# Patient Record
Sex: MALE | Race: WHITE | NOT HISPANIC OR LATINO | Employment: FULL TIME | URBAN - NONMETROPOLITAN AREA
[De-identification: names, ages, dates, MRNs, and addresses within clinical notes are randomized per-mention and may not be internally consistent; named-entity substitution may affect disease eponyms.]

---

## 2017-01-16 ENCOUNTER — TELEPHONE (OUTPATIENT)
Dept: FAMILY MEDICINE CLINIC | Facility: CLINIC | Age: 47
End: 2017-01-16

## 2017-01-16 DIAGNOSIS — R73.9 ELEVATED BLOOD SUGAR LEVEL: Primary | ICD-10-CM

## 2017-01-16 DIAGNOSIS — Z13.220 SCREENING CHOLESTEROL LEVEL: ICD-10-CM

## 2017-05-15 RX ORDER — ESCITALOPRAM OXALATE 10 MG/1
TABLET ORAL
Qty: 135 TABLET | Refills: 2 | Status: SHIPPED | OUTPATIENT
Start: 2017-05-15 | End: 2017-07-12 | Stop reason: SDUPTHER

## 2017-07-12 ENCOUNTER — LAB (OUTPATIENT)
Dept: LAB | Facility: HOSPITAL | Age: 47
End: 2017-07-12

## 2017-07-12 ENCOUNTER — OFFICE VISIT (OUTPATIENT)
Dept: FAMILY MEDICINE CLINIC | Facility: CLINIC | Age: 47
End: 2017-07-12

## 2017-07-12 VITALS
WEIGHT: 275.6 LBS | BODY MASS INDEX: 41.77 KG/M2 | HEART RATE: 128 BPM | SYSTOLIC BLOOD PRESSURE: 132 MMHG | DIASTOLIC BLOOD PRESSURE: 80 MMHG | OXYGEN SATURATION: 98 % | HEIGHT: 68 IN

## 2017-07-12 DIAGNOSIS — K21.9 GASTROESOPHAGEAL REFLUX DISEASE WITHOUT ESOPHAGITIS: ICD-10-CM

## 2017-07-12 DIAGNOSIS — F41.0 PANIC DISORDER: Primary | ICD-10-CM

## 2017-07-12 DIAGNOSIS — F41.1 GENERALIZED ANXIETY DISORDER: ICD-10-CM

## 2017-07-12 DIAGNOSIS — Z79.899 HIGH RISK MEDICATION USE: ICD-10-CM

## 2017-07-12 DIAGNOSIS — M25.551 PAIN OF RIGHT HIP JOINT: ICD-10-CM

## 2017-07-12 DIAGNOSIS — Z13.220 SCREENING CHOLESTEROL LEVEL: ICD-10-CM

## 2017-07-12 LAB
ALBUMIN SERPL-MCNC: 4.5 G/DL (ref 3.4–4.8)
ALBUMIN/GLOB SERPL: 1.3 G/DL (ref 1.1–1.8)
ALP SERPL-CCNC: 93 U/L (ref 38–126)
ALT SERPL W P-5'-P-CCNC: 56 U/L (ref 21–72)
ANION GAP SERPL CALCULATED.3IONS-SCNC: 12 MMOL/L (ref 5–15)
ARTICHOKE IGE QN: 164 MG/DL (ref 1–129)
AST SERPL-CCNC: 43 U/L (ref 17–59)
BILIRUB SERPL-MCNC: 0.8 MG/DL (ref 0.2–1.3)
BUN BLD-MCNC: 14 MG/DL (ref 7–21)
BUN/CREAT SERPL: 15.7 (ref 7–25)
CALCIUM SPEC-SCNC: 9.6 MG/DL (ref 8.4–10.2)
CHLORIDE SERPL-SCNC: 103 MMOL/L (ref 95–110)
CHOLEST SERPL-MCNC: 223 MG/DL (ref 0–199)
CO2 SERPL-SCNC: 25 MMOL/L (ref 22–31)
CREAT BLD-MCNC: 0.89 MG/DL (ref 0.7–1.3)
GFR SERPL CREATININE-BSD FRML MDRD: 92 ML/MIN/1.73 (ref 63–147)
GLOBULIN UR ELPH-MCNC: 3.5 GM/DL (ref 2.3–3.5)
GLUCOSE BLD-MCNC: 100 MG/DL (ref 60–100)
HDLC SERPL-MCNC: 42 MG/DL (ref 60–200)
LDLC/HDLC SERPL: 3.33 {RATIO} (ref 0–3.55)
MAGNESIUM SERPL-MCNC: 2.2 MG/DL (ref 1.6–2.3)
POTASSIUM BLD-SCNC: 4 MMOL/L (ref 3.5–5.1)
PROT SERPL-MCNC: 8 G/DL (ref 6.3–8.6)
SODIUM BLD-SCNC: 140 MMOL/L (ref 137–145)
TRIGL SERPL-MCNC: 206 MG/DL (ref 20–199)
VIT B12 BLD-MCNC: 274 PG/ML (ref 239–931)

## 2017-07-12 PROCEDURE — 36415 COLL VENOUS BLD VENIPUNCTURE: CPT | Performed by: FAMILY MEDICINE

## 2017-07-12 PROCEDURE — 99213 OFFICE O/P EST LOW 20 MIN: CPT | Performed by: FAMILY MEDICINE

## 2017-07-12 PROCEDURE — 80053 COMPREHEN METABOLIC PANEL: CPT | Performed by: FAMILY MEDICINE

## 2017-07-12 PROCEDURE — 80061 LIPID PANEL: CPT | Performed by: FAMILY MEDICINE

## 2017-07-12 PROCEDURE — 83735 ASSAY OF MAGNESIUM: CPT | Performed by: FAMILY MEDICINE

## 2017-07-12 PROCEDURE — 82607 VITAMIN B-12: CPT | Performed by: FAMILY MEDICINE

## 2017-07-12 RX ORDER — CELECOXIB 200 MG/1
200 CAPSULE ORAL DAILY
Qty: 90 CAPSULE | Refills: 3 | Status: SHIPPED | OUTPATIENT
Start: 2017-07-12 | End: 2018-02-22 | Stop reason: SDUPTHER

## 2017-07-12 RX ORDER — CLONAZEPAM 0.5 MG/1
0.5 TABLET ORAL 2 TIMES DAILY
Qty: 60 TABLET | Refills: 2 | Status: SHIPPED | OUTPATIENT
Start: 2017-07-12 | End: 2018-02-22 | Stop reason: SDUPTHER

## 2017-07-12 RX ORDER — ESCITALOPRAM OXALATE 10 MG/1
TABLET ORAL
Qty: 135 TABLET | Refills: 2 | Status: SHIPPED | OUTPATIENT
Start: 2017-07-12 | End: 2018-02-22 | Stop reason: SDUPTHER

## 2017-07-12 RX ORDER — OMEPRAZOLE 20 MG/1
20 CAPSULE, DELAYED RELEASE ORAL DAILY
Qty: 90 CAPSULE | Refills: 3 | Status: SHIPPED | OUTPATIENT
Start: 2017-07-12 | End: 2018-02-22

## 2017-07-12 NOTE — PROGRESS NOTES
Subjective   Jamey Berry is a 46 y.o. male.     Anxiety   Presents for follow-up visit. Patient reports no depressed mood, impotence, insomnia or nervous/anxious behavior.     His past medical history is significant for depression.   Depression   Visit Type: follow-up  Patient is not experiencing: depressed mood, fatigue, impotence, insomnia and nervousness/anxiety.    Heartburn   He complains of heartburn. He reports no abdominal pain.        The following portions of the patient's history were reviewed and updated as appropriate: allergies, current medications, past family history, past medical history, past social history, past surgical history and problem list.    Review of Systems   Gastrointestinal: Positive for heartburn. Negative for abdominal distention and abdominal pain.   Genitourinary: Negative for impotence.   Musculoskeletal: Negative for back pain. Arthralgias: right hip.   Psychiatric/Behavioral: The patient is not nervous/anxious and does not have insomnia.        Objective   Physical Exam   Constitutional: He is oriented to person, place, and time. He appears well-developed and well-nourished. No distress.   HENT:   Head: Normocephalic and atraumatic.   Cardiovascular: Normal rate and regular rhythm.  Exam reveals no gallop and no friction rub.    No murmur heard.  Pulmonary/Chest: Effort normal. No respiratory distress. He has no wheezes. He has no rales. He exhibits no tenderness.   Musculoskeletal:        Right hip: He exhibits normal range of motion, normal strength and no tenderness.   Neurological: He is alert and oriented to person, place, and time.   Skin: Skin is warm and dry. He is not diaphoretic.   Psychiatric: He has a normal mood and affect. His behavior is normal. Judgment and thought content normal.   Nursing note and vitals reviewed.      Assessment/Plan   Problems Addressed this Visit        Other    Panic disorder - Primary    Relevant Medications    escitalopram  (LEXAPRO) 10 MG tablet    Generalized anxiety disorder    Relevant Medications    escitalopram (LEXAPRO) 10 MG tablet      Other Visit Diagnoses     Pain of right hip joint        Relevant Orders    Comprehensive Metabolic Panel    Gastroesophageal reflux disease without esophagitis        Relevant Medications    omeprazole (priLOSEC) 20 MG capsule    Other Relevant Orders    Vitamin B12    Magnesium    Screening cholesterol level        Relevant Orders    Lipid Panel    High risk medication use        Relevant Orders    Comprehensive Metabolic Panel    Vitamin B12    Magnesium

## 2017-07-13 NOTE — PROGRESS NOTES
Cholesterol is high.  Recommend low cholesterol diet for 3 months and then recheck fasting lipid panel.  Refer him to a dietitian if he will go.

## 2017-07-17 ENCOUNTER — TELEPHONE (OUTPATIENT)
Dept: FAMILY MEDICINE CLINIC | Facility: CLINIC | Age: 47
End: 2017-07-17

## 2017-07-17 DIAGNOSIS — E78.00 ELEVATED CHOLESTEROL: Primary | ICD-10-CM

## 2017-07-17 NOTE — TELEPHONE ENCOUNTER
Pr Dr. Sood, Mr. Berry has been called with his recent lab results & recommendations.  Continue his current medications and follow-up as planned or sooner if any problems.    Labs ordered for 3 mos.  He does not want to go to the dietician at this time.  He is out of town on business and does not know his future schedulde      ----- Message from Erik Sood MD sent at 7/13/2017  4:38 PM CDT -----  Cholesterol is high.  Recommend low cholesterol diet for 3 months and then recheck fasting lipid panel.  Refer him to a dietitian if he will go.

## 2017-07-17 NOTE — PROGRESS NOTES
Pr Dr. Sood, Mr. Berry has been called with his recent lab results & recommendations.  Continue his current medications and follow-up as planned or sooner if any problems.

## 2017-07-17 NOTE — PROGRESS NOTES
Pr Dr. Sood, Mr. Berry has been called with his recent lab results & recommendations.  Continue his current medications and follow-up as planned or sooner if any problems.    Labs ordered for 3 mos.  He does not want to go to the dietician at this time.  He is out of town on business and does not know his future schedulde

## 2018-02-22 ENCOUNTER — TELEPHONE (OUTPATIENT)
Dept: FAMILY MEDICINE CLINIC | Facility: CLINIC | Age: 48
End: 2018-02-22

## 2018-02-22 ENCOUNTER — PRIOR AUTHORIZATION (OUTPATIENT)
Dept: FAMILY MEDICINE CLINIC | Facility: CLINIC | Age: 48
End: 2018-02-22

## 2018-02-22 ENCOUNTER — APPOINTMENT (OUTPATIENT)
Dept: LAB | Facility: HOSPITAL | Age: 48
End: 2018-02-22

## 2018-02-22 ENCOUNTER — OFFICE VISIT (OUTPATIENT)
Dept: FAMILY MEDICINE CLINIC | Facility: CLINIC | Age: 48
End: 2018-02-22

## 2018-02-22 VITALS
HEART RATE: 105 BPM | SYSTOLIC BLOOD PRESSURE: 132 MMHG | DIASTOLIC BLOOD PRESSURE: 82 MMHG | HEIGHT: 68 IN | WEIGHT: 289 LBS | BODY MASS INDEX: 43.8 KG/M2 | OXYGEN SATURATION: 99 %

## 2018-02-22 DIAGNOSIS — F41.1 GENERALIZED ANXIETY DISORDER: Primary | ICD-10-CM

## 2018-02-22 DIAGNOSIS — E78.00 PURE HYPERCHOLESTEROLEMIA: ICD-10-CM

## 2018-02-22 DIAGNOSIS — F32.A DEPRESSIVE DISORDER: ICD-10-CM

## 2018-02-22 DIAGNOSIS — M25.50 ARTHRALGIA, UNSPECIFIED JOINT: ICD-10-CM

## 2018-02-22 DIAGNOSIS — E78.5 ELEVATED LIPIDS: Primary | ICD-10-CM

## 2018-02-22 DIAGNOSIS — Z79.899 HIGH RISK MEDICATION USE: ICD-10-CM

## 2018-02-22 LAB
ALBUMIN SERPL-MCNC: 4.5 G/DL (ref 3.4–4.8)
ALBUMIN/GLOB SERPL: 1.3 G/DL (ref 1.1–1.8)
ALP SERPL-CCNC: 85 U/L (ref 38–126)
ALT SERPL W P-5'-P-CCNC: 56 U/L (ref 21–72)
ANION GAP SERPL CALCULATED.3IONS-SCNC: 15 MMOL/L (ref 5–15)
ARTICHOKE IGE QN: 179 MG/DL (ref 1–129)
AST SERPL-CCNC: 41 U/L (ref 17–59)
BILIRUB SERPL-MCNC: 0.7 MG/DL (ref 0.2–1.3)
BUN BLD-MCNC: 12 MG/DL (ref 7–21)
BUN/CREAT SERPL: 15.8 (ref 7–25)
CALCIUM SPEC-SCNC: 9.7 MG/DL (ref 8.4–10.2)
CHLORIDE SERPL-SCNC: 102 MMOL/L (ref 95–110)
CHOLEST SERPL-MCNC: 246 MG/DL (ref 0–199)
CO2 SERPL-SCNC: 24 MMOL/L (ref 22–31)
CREAT BLD-MCNC: 0.76 MG/DL (ref 0.7–1.3)
GFR SERPL CREATININE-BSD FRML MDRD: 110 ML/MIN/1.73 (ref 63–147)
GLOBULIN UR ELPH-MCNC: 3.5 GM/DL (ref 2.3–3.5)
GLUCOSE BLD-MCNC: 111 MG/DL (ref 60–100)
HDLC SERPL-MCNC: 43 MG/DL (ref 60–200)
LDLC/HDLC SERPL: 4.08 {RATIO} (ref 0–3.55)
POTASSIUM BLD-SCNC: 4.2 MMOL/L (ref 3.5–5.1)
PROT SERPL-MCNC: 8 G/DL (ref 6.3–8.6)
SODIUM BLD-SCNC: 141 MMOL/L (ref 137–145)
TRIGL SERPL-MCNC: 137 MG/DL (ref 20–199)

## 2018-02-22 PROCEDURE — 99214 OFFICE O/P EST MOD 30 MIN: CPT | Performed by: FAMILY MEDICINE

## 2018-02-22 PROCEDURE — 80061 LIPID PANEL: CPT | Performed by: FAMILY MEDICINE

## 2018-02-22 PROCEDURE — 80053 COMPREHEN METABOLIC PANEL: CPT | Performed by: FAMILY MEDICINE

## 2018-02-22 PROCEDURE — 36415 COLL VENOUS BLD VENIPUNCTURE: CPT | Performed by: FAMILY MEDICINE

## 2018-02-22 RX ORDER — CLONAZEPAM 0.5 MG/1
0.5 TABLET ORAL 2 TIMES DAILY PRN
Qty: 60 TABLET | Refills: 2 | Status: SHIPPED | OUTPATIENT
Start: 2018-02-22 | End: 2018-08-10

## 2018-02-22 RX ORDER — CELECOXIB 200 MG/1
200 CAPSULE ORAL DAILY
Qty: 90 CAPSULE | Refills: 3 | Status: SHIPPED | OUTPATIENT
Start: 2018-02-22 | End: 2018-12-28 | Stop reason: SDUPTHER

## 2018-02-22 RX ORDER — ESCITALOPRAM OXALATE 10 MG/1
TABLET ORAL
Qty: 135 TABLET | Refills: 2 | Status: SHIPPED | OUTPATIENT
Start: 2018-02-22 | End: 2018-08-23 | Stop reason: SDUPTHER

## 2018-02-22 RX ORDER — ATORVASTATIN CALCIUM 40 MG/1
40 TABLET, FILM COATED ORAL DAILY
Qty: 30 TABLET | Refills: 5 | Status: SHIPPED | OUTPATIENT
Start: 2018-02-22 | End: 2018-04-16 | Stop reason: SDUPTHER

## 2018-02-22 NOTE — PATIENT INSTRUCTIONS

## 2018-02-22 NOTE — PROGRESS NOTES
Pr Dr. Sood, Mr. Berry has been called with his recent lab results & recommendations.  Continue his current medications and follow-up as planned or sooner if any problems.    New Script sent and labs ordered.

## 2018-02-22 NOTE — TELEPHONE ENCOUNTER
Pr Dr. Sood, Mr. Berry has been called with his recent lab results & recommendations.  Continue his current medications and follow-up as planned or sooner if any problems.    New Script sent and labs ordered.      ----- Message from Erik Sood MD sent at 2/22/2018  2:15 PM CST -----  Cholesterol has not improved.  It's a little worse.  Recommend Lipitor 40 mg daily and recheck CMP and lipid panel in 3 months.  Call for any side effects or new muscle aches we'll start this medicine.

## 2018-02-22 NOTE — PROGRESS NOTES
Cholesterol has not improved.  It's a little worse.  Recommend Lipitor 40 mg daily and recheck CMP and lipid panel in 3 months.  Call for any side effects or new muscle aches we'll start this medicine.

## 2018-02-22 NOTE — PROGRESS NOTES
Subjective   Jamey Berry is a 47 y.o. male.     Anxiety   Presents for follow-up visit. Patient reports no depressed mood, impotence, insomnia or nervous/anxious behavior.     His past medical history is significant for depression.   Depression   Visit Type: follow-up  Patient is not experiencing: depressed mood, fatigue, impotence, insomnia and nervousness/anxiety.    Hyperlipidemia   This is a new problem. The current episode started more than 1 month ago. The problem is uncontrolled. Recent lipid tests were reviewed and are high. Current antihyperlipidemic treatment includes diet change.   Arthritis   Presents for follow-up visit. He reports no pain, stiffness, joint swelling or joint warmth. Affected locations include the right hip and right knee. His pain is at a severity of 5/10.        The following portions of the patient's history were reviewed and updated as appropriate: allergies, current medications, past family history, past medical history, past social history, past surgical history and problem list.    Review of Systems   Gastrointestinal: Negative for abdominal distention.   Genitourinary: Negative for impotence.   Musculoskeletal: Positive for arthritis. Negative for back pain, joint swelling and stiffness. Arthralgias: right hip.   Psychiatric/Behavioral: The patient is not nervous/anxious and does not have insomnia.        Objective   Physical Exam   Constitutional: He is oriented to person, place, and time. He appears well-developed and well-nourished. No distress.   HENT:   Head: Normocephalic and atraumatic.   Cardiovascular: Normal rate and regular rhythm.  Exam reveals no gallop and no friction rub.    No murmur heard.  Pulmonary/Chest: Effort normal. No respiratory distress. He has no wheezes. He has no rales. He exhibits no tenderness.   Musculoskeletal:        Right hip: He exhibits normal range of motion, normal strength and no tenderness.        Right knee: He exhibits decreased  range of motion. No tenderness found.   Neurological: He is alert and oriented to person, place, and time.   Skin: Skin is warm and dry. He is not diaphoretic.   Psychiatric: He has a normal mood and affect. His behavior is normal. Judgment and thought content normal.   Nursing note and vitals reviewed.      Assessment/Plan   Problems Addressed this Visit        Other    Generalized anxiety disorder - Primary    Relevant Medications    escitalopram (LEXAPRO) 10 MG tablet    Depressive disorder    Relevant Medications    escitalopram (LEXAPRO) 10 MG tablet      Other Visit Diagnoses     Arthralgia, unspecified joint        Relevant Orders    Comprehensive Metabolic Panel    Pure hypercholesterolemia        Relevant Orders    Lipid Panel    High risk medication use        Relevant Orders    Comprehensive Metabolic Panel

## 2018-04-16 RX ORDER — ATORVASTATIN CALCIUM 40 MG/1
40 TABLET, FILM COATED ORAL DAILY
Qty: 30 TABLET | Refills: 5 | Status: SHIPPED | OUTPATIENT
Start: 2018-04-16 | End: 2018-08-23 | Stop reason: SDUPTHER

## 2018-05-02 DIAGNOSIS — G89.29 HIP PAIN, CHRONIC, UNSPECIFIED LATERALITY: Primary | ICD-10-CM

## 2018-05-02 DIAGNOSIS — M25.559 HIP PAIN, CHRONIC, UNSPECIFIED LATERALITY: Primary | ICD-10-CM

## 2018-05-22 DIAGNOSIS — M25.551 RIGHT HIP PAIN: Primary | ICD-10-CM

## 2018-05-23 ENCOUNTER — OFFICE VISIT (OUTPATIENT)
Dept: ORTHOPEDIC SURGERY | Facility: CLINIC | Age: 48
End: 2018-05-23

## 2018-05-23 VITALS — HEIGHT: 68 IN | BODY MASS INDEX: 41.37 KG/M2 | WEIGHT: 273 LBS

## 2018-05-23 DIAGNOSIS — M16.51 POST-TRAUMATIC OSTEOARTHRITIS OF RIGHT HIP: ICD-10-CM

## 2018-05-23 DIAGNOSIS — S82.51XS CLOSED DISPLACED FRACTURE OF MEDIAL MALLEOLUS OF RIGHT TIBIA, SEQUELA: ICD-10-CM

## 2018-05-23 DIAGNOSIS — M25.551 RIGHT HIP PAIN: Primary | ICD-10-CM

## 2018-05-23 DIAGNOSIS — S72.22XD CLOSED LEFT SUBTROCHANTERIC FEMUR FRACTURE, WITH ROUTINE HEALING, SUBSEQUENT ENCOUNTER: ICD-10-CM

## 2018-05-23 PROBLEM — S82.51XA CLOSED DISPLACED FRACTURE OF MEDIAL MALLEOLUS OF RIGHT TIBIA: Status: ACTIVE | Noted: 2018-05-23

## 2018-05-23 PROCEDURE — 99214 OFFICE O/P EST MOD 30 MIN: CPT | Performed by: ORTHOPAEDIC SURGERY

## 2018-05-23 RX ORDER — ACETAMINOPHEN 325 MG/1
1000 TABLET ORAL ONCE
Status: CANCELLED | OUTPATIENT
Start: 2018-08-14 | End: 2018-08-14

## 2018-05-23 RX ORDER — CLINDAMYCIN PHOSPHATE 900 MG/50ML
900 INJECTION INTRAVENOUS ONCE
Status: CANCELLED | OUTPATIENT
Start: 2018-08-14 | End: 2018-08-14

## 2018-05-23 RX ORDER — OXYCODONE HCL 10 MG/1
10 TABLET, FILM COATED, EXTENDED RELEASE ORAL ONCE
Status: CANCELLED | OUTPATIENT
Start: 2018-08-14 | End: 2018-08-14

## 2018-05-23 RX ORDER — PREGABALIN 75 MG/1
75 CAPSULE ORAL ONCE
Status: CANCELLED | OUTPATIENT
Start: 2018-08-14 | End: 2018-08-14
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
## 2018-06-11 DIAGNOSIS — M16.11 PRIMARY OSTEOARTHRITIS OF RIGHT HIP: Primary | ICD-10-CM

## 2018-06-11 DIAGNOSIS — M16.11 PRIMARY OSTEOARTHRITIS OF RIGHT HIP: ICD-10-CM

## 2018-06-11 DIAGNOSIS — M25.551 RIGHT HIP PAIN: Primary | ICD-10-CM

## 2018-06-27 ENCOUNTER — LAB (OUTPATIENT)
Dept: LAB | Facility: HOSPITAL | Age: 48
End: 2018-06-27

## 2018-06-27 DIAGNOSIS — M16.51 POST-TRAUMATIC OSTEOARTHRITIS OF RIGHT HIP: ICD-10-CM

## 2018-06-27 DIAGNOSIS — E78.5 ELEVATED LIPIDS: ICD-10-CM

## 2018-06-27 DIAGNOSIS — M16.11 PRIMARY OSTEOARTHRITIS OF RIGHT HIP: ICD-10-CM

## 2018-06-27 DIAGNOSIS — M25.551 RIGHT HIP PAIN: ICD-10-CM

## 2018-06-27 DIAGNOSIS — E78.00 ELEVATED CHOLESTEROL: ICD-10-CM

## 2018-06-27 LAB
ALBUMIN SERPL-MCNC: 4.6 G/DL (ref 3.4–4.8)
ALBUMIN/GLOB SERPL: 1.4 G/DL (ref 1.1–1.8)
ALP SERPL-CCNC: 84 U/L (ref 38–126)
ALT SERPL W P-5'-P-CCNC: 43 U/L (ref 21–72)
ANION GAP SERPL CALCULATED.3IONS-SCNC: 13 MMOL/L (ref 5–15)
ARTICHOKE IGE QN: 77 MG/DL (ref 1–129)
AST SERPL-CCNC: 37 U/L (ref 17–59)
BILIRUB SERPL-MCNC: 0.9 MG/DL (ref 0.2–1.3)
BILIRUB UR QL STRIP: NEGATIVE
BUN BLD-MCNC: 12 MG/DL (ref 7–21)
BUN/CREAT SERPL: 14.5 (ref 7–25)
CALCIUM SPEC-SCNC: 9.5 MG/DL (ref 8.4–10.2)
CHLORIDE SERPL-SCNC: 101 MMOL/L (ref 95–110)
CHOLEST SERPL-MCNC: 150 MG/DL (ref 0–199)
CLARITY UR: CLEAR
CO2 SERPL-SCNC: 27 MMOL/L (ref 22–31)
COLOR UR: YELLOW
CREAT BLD-MCNC: 0.83 MG/DL (ref 0.7–1.3)
GFR SERPL CREATININE-BSD FRML MDRD: 100 ML/MIN/1.73 (ref 63–147)
GLOBULIN UR ELPH-MCNC: 3.2 GM/DL (ref 2.3–3.5)
GLUCOSE BLD-MCNC: 82 MG/DL (ref 60–100)
GLUCOSE UR STRIP-MCNC: NEGATIVE MG/DL
HDLC SERPL-MCNC: 41 MG/DL (ref 60–200)
HGB UR QL STRIP.AUTO: NEGATIVE
KETONES UR QL STRIP: NEGATIVE
LDLC/HDLC SERPL: 1.84 {RATIO} (ref 0–3.55)
LEUKOCYTE ESTERASE UR QL STRIP.AUTO: NEGATIVE
NITRITE UR QL STRIP: NEGATIVE
PH UR STRIP.AUTO: 7 [PH] (ref 5–9)
POTASSIUM BLD-SCNC: 4 MMOL/L (ref 3.5–5.1)
PROT SERPL-MCNC: 7.8 G/DL (ref 6.3–8.6)
PROT UR QL STRIP: NEGATIVE
SODIUM BLD-SCNC: 141 MMOL/L (ref 137–145)
SP GR UR STRIP: 1.01 (ref 1–1.03)
TRIGL SERPL-MCNC: 167 MG/DL (ref 20–199)
UROBILINOGEN UR QL STRIP: NORMAL

## 2018-06-27 PROCEDURE — 80053 COMPREHEN METABOLIC PANEL: CPT

## 2018-06-27 PROCEDURE — 80061 LIPID PANEL: CPT

## 2018-06-27 PROCEDURE — 81003 URINALYSIS AUTO W/O SCOPE: CPT

## 2018-06-27 PROCEDURE — 36415 COLL VENOUS BLD VENIPUNCTURE: CPT

## 2018-06-28 ENCOUNTER — TELEPHONE (OUTPATIENT)
Dept: FAMILY MEDICINE CLINIC | Facility: CLINIC | Age: 48
End: 2018-06-28

## 2018-06-28 NOTE — TELEPHONE ENCOUNTER
Pr Dr. Sood, Mr. Berry has been called with his recent lab results & recommendations.  Continue his current medications and follow-up as planned or sooner if any problems.      ----- Message from Erik Sood MD sent at 6/28/2018  1:24 PM CDT -----  Ok, call or send card.

## 2018-07-03 ENCOUNTER — HOSPITAL ENCOUNTER (OUTPATIENT)
Dept: CT IMAGING | Facility: HOSPITAL | Age: 48
Discharge: HOME | End: 2018-07-03
Admitting: ORTHOPAEDIC SURGERY

## 2018-07-03 VITALS
DIASTOLIC BLOOD PRESSURE: 84 MMHG | HEART RATE: 92 BPM | SYSTOLIC BLOOD PRESSURE: 124 MMHG | OXYGEN SATURATION: 95 % | RESPIRATION RATE: 18 BRPM

## 2018-07-03 DIAGNOSIS — M16.11 PRIMARY OSTEOARTHRITIS OF RIGHT HIP: ICD-10-CM

## 2018-07-03 DIAGNOSIS — M25.551 RIGHT HIP PAIN: ICD-10-CM

## 2018-07-03 LAB
APPEARANCE FLD: ABNORMAL
COLOR FLD: COLORLESS
CRYSTALS FLD MICRO: NORMAL
RBC # FLD AUTO: 112.5 /MM3 (ref 0–0)
SPECIMEN VOL FLD: 1 ML
WBC # FLD: 2 /MM3 (ref 0–5)

## 2018-07-03 PROCEDURE — 87070 CULTURE OTHR SPECIMN AEROBIC: CPT | Performed by: ORTHOPAEDIC SURGERY

## 2018-07-03 PROCEDURE — 25010000002 TRIAMCINOLONE PER 10 MG: Performed by: RADIOLOGY

## 2018-07-03 PROCEDURE — 89050 BODY FLUID CELL COUNT: CPT | Performed by: ORTHOPAEDIC SURGERY

## 2018-07-03 PROCEDURE — 87205 SMEAR GRAM STAIN: CPT | Performed by: ORTHOPAEDIC SURGERY

## 2018-07-03 PROCEDURE — 25010000002 MEPIVACAINE PF 2 % SOLUTION 20 ML VIAL: Performed by: RADIOLOGY

## 2018-07-03 PROCEDURE — 87015 SPECIMEN INFECT AGNT CONCNTJ: CPT | Performed by: ORTHOPAEDIC SURGERY

## 2018-07-03 PROCEDURE — 89060 EXAM SYNOVIAL FLUID CRYSTALS: CPT | Performed by: ORTHOPAEDIC SURGERY

## 2018-07-03 PROCEDURE — 77012 CT SCAN FOR NEEDLE BIOPSY: CPT

## 2018-07-03 RX ORDER — TRIAMCINOLONE ACETONIDE 40 MG/ML
80 INJECTION, SUSPENSION INTRA-ARTICULAR; INTRAMUSCULAR ONCE
Status: COMPLETED | OUTPATIENT
Start: 2018-07-03 | End: 2018-07-03

## 2018-07-03 RX ADMIN — MEPIVACAINE HYDROCHLORIDE 2 ML: 20 INJECTION, SOLUTION EPIDURAL; INFILTRATION at 13:39

## 2018-07-03 RX ADMIN — TRIAMCINOLONE ACETONIDE 80 MG: 40 INJECTION, SUSPENSION INTRA-ARTICULAR; INTRAMUSCULAR at 13:40

## 2018-07-03 NOTE — PRE-PROCEDURE NOTE
Patient for CT right hip steroid injection.Procedure, risks , and benefits discussed with patient and patient  gave informed consent.  H&P dated 6/27/2018 reviewed with no changes.

## 2018-07-03 NOTE — PRE-PROCEDURE NOTE
Patient for CT directed right hip steroid injection. Procedure, risks , and benefits discussed with patient and patient  gave informed consent.  H&P dated 6/27/2018 reviewed with no changes.

## 2018-07-17 LAB
BACTERIA FLD CULT: NORMAL
GRAM STN SPEC: NORMAL
GRAM STN SPEC: NORMAL

## 2018-08-10 ENCOUNTER — APPOINTMENT (OUTPATIENT)
Dept: PREADMISSION TESTING | Facility: HOSPITAL | Age: 48
End: 2018-08-10

## 2018-08-10 VITALS
HEART RATE: 97 BPM | DIASTOLIC BLOOD PRESSURE: 80 MMHG | SYSTOLIC BLOOD PRESSURE: 120 MMHG | OXYGEN SATURATION: 98 % | HEIGHT: 68 IN | BODY MASS INDEX: 41.37 KG/M2 | WEIGHT: 273 LBS | RESPIRATION RATE: 18 BRPM

## 2018-08-10 LAB
ABO GROUP BLD: NORMAL
BLD GP AB SCN SERPL QL: NEGATIVE
DEPRECATED RDW RBC AUTO: 42.4 FL (ref 35.1–43.9)
ERYTHROCYTE [DISTWIDTH] IN BLOOD BY AUTOMATED COUNT: 13.7 % (ref 11.5–14.5)
HCT VFR BLD AUTO: 48.5 % (ref 39–49)
HGB BLD-MCNC: 16.5 G/DL (ref 13.7–17.3)
Lab: NORMAL
MCH RBC QN AUTO: 28.9 PG (ref 26.5–34)
MCHC RBC AUTO-ENTMCNC: 34 G/DL (ref 31.5–36.3)
MCV RBC AUTO: 85.1 FL (ref 80–98)
MRSA DNA SPEC QL NAA+PROBE: NEGATIVE
PLATELET # BLD AUTO: 290 10*3/MM3 (ref 150–450)
PMV BLD AUTO: 9.8 FL (ref 8–12)
RBC # BLD AUTO: 5.7 10*6/MM3 (ref 4.37–5.74)
RH BLD: POSITIVE
T&S EXPIRATION DATE: NORMAL
WBC NRBC COR # BLD: 8.59 10*3/MM3 (ref 3.2–9.8)

## 2018-08-10 PROCEDURE — 86850 RBC ANTIBODY SCREEN: CPT | Performed by: ANESTHESIOLOGY

## 2018-08-10 PROCEDURE — 36415 COLL VENOUS BLD VENIPUNCTURE: CPT

## 2018-08-10 PROCEDURE — 87641 MR-STAPH DNA AMP PROBE: CPT | Performed by: ORTHOPAEDIC SURGERY

## 2018-08-10 PROCEDURE — 85027 COMPLETE CBC AUTOMATED: CPT | Performed by: ANESTHESIOLOGY

## 2018-08-10 PROCEDURE — 86901 BLOOD TYPING SEROLOGIC RH(D): CPT | Performed by: ANESTHESIOLOGY

## 2018-08-10 PROCEDURE — 86900 BLOOD TYPING SEROLOGIC ABO: CPT | Performed by: ANESTHESIOLOGY

## 2018-08-10 RX ORDER — SODIUM CHLORIDE, SODIUM GLUCONATE, SODIUM ACETATE, POTASSIUM CHLORIDE, AND MAGNESIUM CHLORIDE 526; 502; 368; 37; 30 MG/100ML; MG/100ML; MG/100ML; MG/100ML; MG/100ML
1000 INJECTION, SOLUTION INTRAVENOUS CONTINUOUS
Status: CANCELLED | OUTPATIENT
Start: 2018-08-14

## 2018-08-10 RX ORDER — CLONAZEPAM 0.5 MG/1
0.5 TABLET ORAL 2 TIMES DAILY PRN
COMMUNITY
End: 2018-10-04 | Stop reason: SDUPTHER

## 2018-08-10 ASSESSMENT — HOOS JR
HOOS JR SCORE: 10
HOOS JR SCORE: 58.93

## 2018-08-10 NOTE — DISCHARGE INSTRUCTIONS
[facility]  Pre-op Information and Guidelines    You will be called after 2 p.m. the day before your surgery (Friday for Monday surgery) and notified of your time for arrival and approximate surgery time.  If you have not received a call by 4P.M., please contact Same Day Surgery at [phone] of if outside [facility] call [phone].    Please Follow these Important Safety Guidelines:    • The morning of your procedure, take only the medications listed below with   A sip of water:_____________________________________________       ______________________________________________    • DO NOT eat or drink anything after 12:00 midnight the night before surgery  Specific instructions concerning drinking clear liquids will be discussed during  the pre-surgery instruction call the day before your surgery.    • If you take a blood thinner (ex. Plavix, Coumadin, aspirin), ask your doctor when to stop it before surgery  STOP DATE: _________________    • Only 2 visitors are allowed in patient rooms at a time  Your visitors will be asked to wait in the lobby until the admission process is complete with the exception of a parent with a child and patients in need of special assistance.    • YOU CANNOT DRIVE YOURSELF HOME  You must be accompanied by someone who will be responsible for driving you home after surgery and for your care at home.    • DO NOT chew gum, use breath mints, hard candy, or smoke the day of surgery  • DO NOT drink alcohol for at least 24 hours before your surgery  • DO NOT wear any jewelry and remove all body piercing before coming to the hospital  • DO NOT wear make-up to the hospital  • If you are having surgery on an extremity (arm/leg/foot) remove nail polish/artificial nails on the surgical side  • Clothing, glasses, contacts, dentures, and hairpieces must be removed before surgery  • Bathe the night before or the morning of your surgery and do not use powders/lotions on  skin.

## 2018-08-10 NOTE — PAT
Patient states surgeon did NOT instruct him to get any type of clearance prior to surgery other than our pre op.    Chlorhexidine scrub given with instruction sheet.  Instructions reviewed briefly in pre op, understanding verbalized.

## 2018-08-14 ENCOUNTER — HOSPITAL ENCOUNTER (INPATIENT)
Facility: HOSPITAL | Age: 48
LOS: 1 days | Discharge: HOME | End: 2018-08-15
Attending: ORTHOPAEDIC SURGERY | Admitting: ORTHOPAEDIC SURGERY

## 2018-08-14 ENCOUNTER — APPOINTMENT (OUTPATIENT)
Dept: GENERAL RADIOLOGY | Facility: HOSPITAL | Age: 48
End: 2018-08-14

## 2018-08-14 ENCOUNTER — ANESTHESIA (OUTPATIENT)
Dept: PERIOP | Facility: HOSPITAL | Age: 48
End: 2018-08-14

## 2018-08-14 ENCOUNTER — ANESTHESIA EVENT (OUTPATIENT)
Dept: PERIOP | Facility: HOSPITAL | Age: 48
End: 2018-08-14

## 2018-08-14 DIAGNOSIS — Z74.09 IMPAIRED FUNCTIONAL MOBILITY, BALANCE, GAIT, AND ENDURANCE: Primary | ICD-10-CM

## 2018-08-14 DIAGNOSIS — Z74.09 IMPAIRED MOBILITY AND ACTIVITIES OF DAILY LIVING: ICD-10-CM

## 2018-08-14 DIAGNOSIS — Z78.9 IMPAIRED MOBILITY AND ACTIVITIES OF DAILY LIVING: ICD-10-CM

## 2018-08-14 DIAGNOSIS — M16.51 POST-TRAUMATIC OSTEOARTHRITIS OF RIGHT HIP: ICD-10-CM

## 2018-08-14 LAB
ABO GROUP BLD: NORMAL
ARTERIAL PATENCY WRIST A: ABNORMAL
ATMOSPHERIC PRESS: 749 MMHG
BASE EXCESS BLDA CALC-SCNC: -3.1 MMOL/L (ref 0–2)
BDY SITE: ABNORMAL
BLD GP AB SCN SERPL QL: NEGATIVE
CA-I BLD-MCNC: 3.76 MG/DL (ref 4.6–5.6)
COHGB MFR BLD: 0.9 % (ref 0–5)
GLUCOSE BLDA-MCNC: 120 MMOL/L (ref 65–95)
HCO3 BLDA-SCNC: 21.5 MMOL/L (ref 20–26)
HCT VFR BLD CALC: 38.8 % (ref 38–51)
HGB BLDA-MCNC: 12.7 G/DL (ref 14–18)
Lab: ABNORMAL
Lab: NORMAL
METHGB BLD QL: 0.6 % (ref 0–3)
MODALITY: ABNORMAL
OXYHGB MFR BLDV: 97.7 % (ref 94–99)
PCO2 BLDA: 36.2 MM HG (ref 35–45)
PCO2 TEMP ADJ BLD: ABNORMAL MM HG (ref 35–48)
PH BLDA: 7.38 PH UNITS (ref 7.35–7.45)
PH, TEMP CORRECTED: ABNORMAL PH UNITS
PO2 BLDA: 126 MM HG (ref 83–108)
PO2 TEMP ADJ BLD: ABNORMAL MM HG (ref 83–108)
POTASSIUM BLDA-SCNC: 3.6 MMOL/L (ref 3.4–4.5)
RH BLD: POSITIVE
SAO2 % BLDCOA: 99.2 % (ref 94–99)
SODIUM BLDA-SCNC: 140 MMOL/L (ref 136–146)
T&S EXPIRATION DATE: NORMAL
VENTILATOR MODE: ABNORMAL

## 2018-08-14 PROCEDURE — 86900 BLOOD TYPING SEROLOGIC ABO: CPT | Performed by: ORTHOPAEDIC SURGERY

## 2018-08-14 PROCEDURE — 86901 BLOOD TYPING SEROLOGIC RH(D): CPT | Performed by: ORTHOPAEDIC SURGERY

## 2018-08-14 PROCEDURE — 25010000002 PROPOFOL 10 MG/ML EMULSION: Performed by: NURSE ANESTHETIST, CERTIFIED REGISTERED

## 2018-08-14 PROCEDURE — 25010000002 NEOSTIGMINE 4 MG/4ML SOLUTION PREFILLED SYRINGE: Performed by: NURSE ANESTHETIST, CERTIFIED REGISTERED

## 2018-08-14 PROCEDURE — 88300 SURGICAL PATH GROSS: CPT | Performed by: PATHOLOGY

## 2018-08-14 PROCEDURE — 82805 BLOOD GASES W/O2 SATURATION: CPT

## 2018-08-14 PROCEDURE — 86850 RBC ANTIBODY SCREEN: CPT | Performed by: ORTHOPAEDIC SURGERY

## 2018-08-14 PROCEDURE — 94799 UNLISTED PULMONARY SVC/PX: CPT

## 2018-08-14 PROCEDURE — 88305 TISSUE EXAM BY PATHOLOGIST: CPT | Performed by: ORTHOPAEDIC SURGERY

## 2018-08-14 PROCEDURE — 88311 DECALCIFY TISSUE: CPT | Performed by: PATHOLOGY

## 2018-08-14 PROCEDURE — 73501 X-RAY EXAM HIP UNI 1 VIEW: CPT

## 2018-08-14 PROCEDURE — 88300 SURGICAL PATH GROSS: CPT | Performed by: ORTHOPAEDIC SURGERY

## 2018-08-14 PROCEDURE — 25010000002 SUCCINYLCHOLINE PER 20 MG: Performed by: NURSE ANESTHETIST, CERTIFIED REGISTERED

## 2018-08-14 PROCEDURE — 82375 ASSAY CARBOXYHB QUANT: CPT

## 2018-08-14 PROCEDURE — C1776 JOINT DEVICE (IMPLANTABLE): HCPCS | Performed by: ORTHOPAEDIC SURGERY

## 2018-08-14 PROCEDURE — 83050 HGB METHEMOGLOBIN QUAN: CPT

## 2018-08-14 PROCEDURE — C1713 ANCHOR/SCREW BN/BN,TIS/BN: HCPCS | Performed by: ORTHOPAEDIC SURGERY

## 2018-08-14 PROCEDURE — 25010000002 HYDROMORPHONE PER 4 MG: Performed by: NURSE ANESTHETIST, CERTIFIED REGISTERED

## 2018-08-14 PROCEDURE — 97166 OT EVAL MOD COMPLEX 45 MIN: CPT

## 2018-08-14 PROCEDURE — 87070 CULTURE OTHR SPECIMN AEROBIC: CPT | Performed by: ORTHOPAEDIC SURGERY

## 2018-08-14 PROCEDURE — 87205 SMEAR GRAM STAIN: CPT | Performed by: ORTHOPAEDIC SURGERY

## 2018-08-14 PROCEDURE — 27130 TOTAL HIP ARTHROPLASTY: CPT | Performed by: ORTHOPAEDIC SURGERY

## 2018-08-14 PROCEDURE — 88311 DECALCIFY TISSUE: CPT | Performed by: ORTHOPAEDIC SURGERY

## 2018-08-14 PROCEDURE — 25010000002 ONDANSETRON PER 1 MG: Performed by: NURSE ANESTHETIST, CERTIFIED REGISTERED

## 2018-08-14 PROCEDURE — 25010000002 FENTANYL CITRATE (PF) 100 MCG/2ML SOLUTION: Performed by: NURSE ANESTHETIST, CERTIFIED REGISTERED

## 2018-08-14 PROCEDURE — 25010000002 PHENYLEPHRINE PER 1 ML: Performed by: NURSE ANESTHETIST, CERTIFIED REGISTERED

## 2018-08-14 PROCEDURE — G8978 MOBILITY CURRENT STATUS: HCPCS

## 2018-08-14 PROCEDURE — 0QP604Z REMOVAL OF INTERNAL FIXATION DEVICE FROM RIGHT UPPER FEMUR, OPEN APPROACH: ICD-10-PCS | Performed by: ORTHOPAEDIC SURGERY

## 2018-08-14 PROCEDURE — G8979 MOBILITY GOAL STATUS: HCPCS

## 2018-08-14 PROCEDURE — G8988 SELF CARE GOAL STATUS: HCPCS

## 2018-08-14 PROCEDURE — 0SR90JA REPLACEMENT OF RIGHT HIP JOINT WITH SYNTHETIC SUBSTITUTE, UNCEMENTED, OPEN APPROACH: ICD-10-PCS | Performed by: ORTHOPAEDIC SURGERY

## 2018-08-14 PROCEDURE — G8987 SELF CARE CURRENT STATUS: HCPCS

## 2018-08-14 PROCEDURE — 25010000002 DEXAMETHASONE PER 1 MG: Performed by: NURSE ANESTHETIST, CERTIFIED REGISTERED

## 2018-08-14 PROCEDURE — 76000 FLUOROSCOPY <1 HR PHYS/QHP: CPT

## 2018-08-14 PROCEDURE — 25010000002 MIDAZOLAM PER 1 MG: Performed by: NURSE ANESTHETIST, CERTIFIED REGISTERED

## 2018-08-14 PROCEDURE — 88305 TISSUE EXAM BY PATHOLOGIST: CPT | Performed by: PATHOLOGY

## 2018-08-14 PROCEDURE — C1769 GUIDE WIRE: HCPCS | Performed by: ORTHOPAEDIC SURGERY

## 2018-08-14 PROCEDURE — 97162 PT EVAL MOD COMPLEX 30 MIN: CPT

## 2018-08-14 DEVICE — TOTL HIP GRIPTION CUP DEPUY UPCHRG: Type: IMPLANTABLE DEVICE | Site: HIP | Status: FUNCTIONAL

## 2018-08-14 DEVICE — CABL W CR1.7MM 750ML STRL: Type: IMPLANTABLE DEVICE | Status: FUNCTIONAL

## 2018-08-14 DEVICE — TOTL HIP MOA DEPUY 9641333: Type: IMPLANTABLE DEVICE | Site: HIP | Status: FUNCTIONAL

## 2018-08-14 DEVICE — CORAIL HIP SYSTEM CEMENTLESS FEMORAL STEM HA COATED REVISION 12/14 AMT 135 DEGREES STANDARD COLLAR SIZE 14
Type: IMPLANTABLE DEVICE | Site: HIP | Status: FUNCTIONAL
Brand: CORAIL

## 2018-08-14 DEVICE — PINNACLE GRIPTION ACETABULAR SHELL SECTOR 52MM OD
Type: IMPLANTABLE DEVICE | Site: HIP | Status: FUNCTIONAL
Brand: PINNACLE GRIPTION

## 2018-08-14 DEVICE — PINNACLE HIP SOLUTIONS ALTRX POLYETHYLENE ACETABULAR LINER NEUTRAL 36MM ID 52MM OD
Type: IMPLANTABLE DEVICE | Site: HIP | Status: FUNCTIONAL
Brand: PINNACLE ALTRX

## 2018-08-14 DEVICE — TOTL HIP STEM DEPUY UPCHRG: Type: IMPLANTABLE DEVICE | Site: HIP | Status: FUNCTIONAL

## 2018-08-14 DEVICE — M-SPEC METAL FEMORAL HEAD 12/14 TAPER DIAMETER 36MM -2: Type: IMPLANTABLE DEVICE | Site: HIP | Status: FUNCTIONAL

## 2018-08-14 RX ORDER — SODIUM CHLORIDE, SODIUM GLUCONATE, SODIUM ACETATE, POTASSIUM CHLORIDE, AND MAGNESIUM CHLORIDE 526; 502; 368; 37; 30 MG/100ML; MG/100ML; MG/100ML; MG/100ML; MG/100ML
1000 INJECTION, SOLUTION INTRAVENOUS CONTINUOUS
Status: DISCONTINUED | OUTPATIENT
Start: 2018-08-14 | End: 2018-08-15 | Stop reason: HOSPADM

## 2018-08-14 RX ORDER — SODIUM CHLORIDE 0.9 % (FLUSH) 0.9 %
1-10 SYRINGE (ML) INJECTION AS NEEDED
Status: DISCONTINUED | OUTPATIENT
Start: 2018-08-14 | End: 2018-08-15 | Stop reason: HOSPADM

## 2018-08-14 RX ORDER — OXYCODONE HCL 10 MG/1
10 TABLET, FILM COATED, EXTENDED RELEASE ORAL EVERY 12 HOURS SCHEDULED
Status: DISCONTINUED | OUTPATIENT
Start: 2018-08-14 | End: 2018-08-15 | Stop reason: HOSPADM

## 2018-08-14 RX ORDER — NALOXONE HCL 0.4 MG/ML
0.1 VIAL (ML) INJECTION
Status: DISCONTINUED | OUTPATIENT
Start: 2018-08-14 | End: 2018-08-15 | Stop reason: HOSPADM

## 2018-08-14 RX ORDER — CLINDAMYCIN PHOSPHATE 900 MG/50ML
900 INJECTION INTRAVENOUS EVERY 8 HOURS
Status: COMPLETED | OUTPATIENT
Start: 2018-08-14 | End: 2018-08-15

## 2018-08-14 RX ORDER — ONDANSETRON 2 MG/ML
4 INJECTION INTRAMUSCULAR; INTRAVENOUS ONCE AS NEEDED
Status: DISCONTINUED | OUTPATIENT
Start: 2018-08-14 | End: 2018-08-14 | Stop reason: HOSPADM

## 2018-08-14 RX ORDER — DEXAMETHASONE SODIUM PHOSPHATE 4 MG/ML
INJECTION, SOLUTION INTRA-ARTICULAR; INTRALESIONAL; INTRAMUSCULAR; INTRAVENOUS; SOFT TISSUE AS NEEDED
Status: DISCONTINUED | OUTPATIENT
Start: 2018-08-14 | End: 2018-08-14 | Stop reason: SURG

## 2018-08-14 RX ORDER — MIDAZOLAM HYDROCHLORIDE 1 MG/ML
INJECTION INTRAMUSCULAR; INTRAVENOUS AS NEEDED
Status: DISCONTINUED | OUTPATIENT
Start: 2018-08-14 | End: 2018-08-14 | Stop reason: SURG

## 2018-08-14 RX ORDER — ONDANSETRON 4 MG/1
4 TABLET, ORALLY DISINTEGRATING ORAL EVERY 6 HOURS PRN
Status: DISCONTINUED | OUTPATIENT
Start: 2018-08-14 | End: 2018-08-15 | Stop reason: HOSPADM

## 2018-08-14 RX ORDER — ACETAMINOPHEN 500 MG
1000 TABLET ORAL 4 TIMES DAILY
Status: DISCONTINUED | OUTPATIENT
Start: 2018-08-14 | End: 2018-08-15 | Stop reason: HOSPADM

## 2018-08-14 RX ORDER — ONDANSETRON 4 MG/1
4 TABLET, FILM COATED ORAL EVERY 6 HOURS PRN
Status: DISCONTINUED | OUTPATIENT
Start: 2018-08-14 | End: 2018-08-15 | Stop reason: HOSPADM

## 2018-08-14 RX ORDER — FAMOTIDINE 40 MG/1
40 TABLET, FILM COATED ORAL DAILY
Status: DISCONTINUED | OUTPATIENT
Start: 2018-08-14 | End: 2018-08-15 | Stop reason: HOSPADM

## 2018-08-14 RX ORDER — OXYCODONE HYDROCHLORIDE 5 MG/1
5 TABLET ORAL EVERY 4 HOURS PRN
Status: DISCONTINUED | OUTPATIENT
Start: 2018-08-14 | End: 2018-08-15 | Stop reason: HOSPADM

## 2018-08-14 RX ORDER — ACETAMINOPHEN 325 MG/1
1000 TABLET ORAL ONCE
Status: COMPLETED | OUTPATIENT
Start: 2018-08-14 | End: 2018-08-14

## 2018-08-14 RX ORDER — SODIUM CHLORIDE, SODIUM GLUCONATE, SODIUM ACETATE, POTASSIUM CHLORIDE, AND MAGNESIUM CHLORIDE 526; 502; 368; 37; 30 MG/100ML; MG/100ML; MG/100ML; MG/100ML; MG/100ML
INJECTION, SOLUTION INTRAVENOUS CONTINUOUS PRN
Status: DISCONTINUED | OUTPATIENT
Start: 2018-08-14 | End: 2018-08-14 | Stop reason: SURG

## 2018-08-14 RX ORDER — SODIUM CHLORIDE 9 MG/ML
75 INJECTION, SOLUTION INTRAVENOUS CONTINUOUS
Status: DISCONTINUED | OUTPATIENT
Start: 2018-08-14 | End: 2018-08-15 | Stop reason: HOSPADM

## 2018-08-14 RX ORDER — ROCURONIUM BROMIDE 10 MG/ML
INJECTION, SOLUTION INTRAVENOUS AS NEEDED
Status: DISCONTINUED | OUTPATIENT
Start: 2018-08-14 | End: 2018-08-14 | Stop reason: SURG

## 2018-08-14 RX ORDER — FLUMAZENIL 0.1 MG/ML
0.2 INJECTION INTRAVENOUS AS NEEDED
Status: DISCONTINUED | OUTPATIENT
Start: 2018-08-14 | End: 2018-08-14 | Stop reason: HOSPADM

## 2018-08-14 RX ORDER — CLONAZEPAM 0.5 MG/1
0.5 TABLET ORAL 2 TIMES DAILY PRN
Status: DISCONTINUED | OUTPATIENT
Start: 2018-08-14 | End: 2018-08-15 | Stop reason: HOSPADM

## 2018-08-14 RX ORDER — DIPHENHYDRAMINE HYDROCHLORIDE 50 MG/ML
12.5 INJECTION INTRAMUSCULAR; INTRAVENOUS
Status: DISCONTINUED | OUTPATIENT
Start: 2018-08-14 | End: 2018-08-14 | Stop reason: HOSPADM

## 2018-08-14 RX ORDER — 0.9 % SODIUM CHLORIDE 0.9 %
VIAL (ML) INJECTION AS NEEDED
Status: DISCONTINUED | OUTPATIENT
Start: 2018-08-14 | End: 2018-08-15 | Stop reason: HOSPADM

## 2018-08-14 RX ORDER — PROPOFOL 10 MG/ML
VIAL (ML) INTRAVENOUS AS NEEDED
Status: DISCONTINUED | OUTPATIENT
Start: 2018-08-14 | End: 2018-08-14 | Stop reason: SURG

## 2018-08-14 RX ORDER — HYDROMORPHONE HCL 110MG/55ML
PATIENT CONTROLLED ANALGESIA SYRINGE INTRAVENOUS AS NEEDED
Status: DISCONTINUED | OUTPATIENT
Start: 2018-08-14 | End: 2018-08-14 | Stop reason: SURG

## 2018-08-14 RX ORDER — BACITRACIN 50000 [IU]/1
INJECTION, POWDER, FOR SOLUTION INTRAMUSCULAR AS NEEDED
Status: DISCONTINUED | OUTPATIENT
Start: 2018-08-14 | End: 2018-08-15 | Stop reason: HOSPADM

## 2018-08-14 RX ORDER — NEOSTIGMINE METHYLSULFATE 4 MG/4 ML
SYRINGE (ML) INTRAVENOUS AS NEEDED
Status: DISCONTINUED | OUTPATIENT
Start: 2018-08-14 | End: 2018-08-14 | Stop reason: SURG

## 2018-08-14 RX ORDER — EPHEDRINE SULFATE 50 MG/ML
5 INJECTION, SOLUTION INTRAVENOUS ONCE AS NEEDED
Status: DISCONTINUED | OUTPATIENT
Start: 2018-08-14 | End: 2018-08-14 | Stop reason: HOSPADM

## 2018-08-14 RX ORDER — EPHEDRINE SULFATE 50 MG/ML
INJECTION, SOLUTION INTRAVENOUS AS NEEDED
Status: DISCONTINUED | OUTPATIENT
Start: 2018-08-14 | End: 2018-08-14 | Stop reason: SURG

## 2018-08-14 RX ORDER — ATORVASTATIN CALCIUM 40 MG/1
40 TABLET, FILM COATED ORAL DAILY
Status: DISCONTINUED | OUTPATIENT
Start: 2018-08-14 | End: 2018-08-15 | Stop reason: HOSPADM

## 2018-08-14 RX ORDER — ACETAMINOPHEN 650 MG/1
650 SUPPOSITORY RECTAL ONCE AS NEEDED
Status: DISCONTINUED | OUTPATIENT
Start: 2018-08-14 | End: 2018-08-14 | Stop reason: HOSPADM

## 2018-08-14 RX ORDER — OXYCODONE HCL 10 MG/1
10 TABLET, FILM COATED, EXTENDED RELEASE ORAL ONCE
Status: COMPLETED | OUTPATIENT
Start: 2018-08-14 | End: 2018-08-14

## 2018-08-14 RX ORDER — FENTANYL CITRATE 50 UG/ML
INJECTION, SOLUTION INTRAMUSCULAR; INTRAVENOUS AS NEEDED
Status: DISCONTINUED | OUTPATIENT
Start: 2018-08-14 | End: 2018-08-14 | Stop reason: SURG

## 2018-08-14 RX ORDER — SUCCINYLCHOLINE CHLORIDE 20 MG/ML
INJECTION INTRAMUSCULAR; INTRAVENOUS AS NEEDED
Status: DISCONTINUED | OUTPATIENT
Start: 2018-08-14 | End: 2018-08-14 | Stop reason: SURG

## 2018-08-14 RX ORDER — CALCIUM CHLORIDE 100 MG/ML
INJECTION INTRAVENOUS; INTRAVENTRICULAR AS NEEDED
Status: DISCONTINUED | OUTPATIENT
Start: 2018-08-14 | End: 2018-08-14 | Stop reason: SURG

## 2018-08-14 RX ORDER — ONDANSETRON 2 MG/ML
4 INJECTION INTRAMUSCULAR; INTRAVENOUS EVERY 6 HOURS PRN
Status: DISCONTINUED | OUTPATIENT
Start: 2018-08-14 | End: 2018-08-15 | Stop reason: HOSPADM

## 2018-08-14 RX ORDER — GLYCOPYRROLATE 0.2 MG/ML
INJECTION INTRAMUSCULAR; INTRAVENOUS AS NEEDED
Status: DISCONTINUED | OUTPATIENT
Start: 2018-08-14 | End: 2018-08-14 | Stop reason: SURG

## 2018-08-14 RX ORDER — PREGABALIN 75 MG/1
75 CAPSULE ORAL ONCE
Status: COMPLETED | OUTPATIENT
Start: 2018-08-14 | End: 2018-08-14

## 2018-08-14 RX ORDER — LABETALOL HYDROCHLORIDE 5 MG/ML
5 INJECTION, SOLUTION INTRAVENOUS
Status: DISCONTINUED | OUTPATIENT
Start: 2018-08-14 | End: 2018-08-14 | Stop reason: HOSPADM

## 2018-08-14 RX ORDER — FERROUS SULFATE TAB EC 324 MG (65 MG FE EQUIVALENT) 324 (65 FE) MG
324 TABLET DELAYED RESPONSE ORAL
Status: DISCONTINUED | OUTPATIENT
Start: 2018-08-15 | End: 2018-08-15 | Stop reason: HOSPADM

## 2018-08-14 RX ORDER — CLINDAMYCIN PHOSPHATE 900 MG/50ML
900 INJECTION INTRAVENOUS ONCE
Status: COMPLETED | OUTPATIENT
Start: 2018-08-14 | End: 2018-08-14

## 2018-08-14 RX ORDER — ACETAMINOPHEN 325 MG/1
650 TABLET ORAL ONCE AS NEEDED
Status: DISCONTINUED | OUTPATIENT
Start: 2018-08-14 | End: 2018-08-14 | Stop reason: HOSPADM

## 2018-08-14 RX ORDER — LIDOCAINE HYDROCHLORIDE 20 MG/ML
INJECTION, SOLUTION INFILTRATION; PERINEURAL AS NEEDED
Status: DISCONTINUED | OUTPATIENT
Start: 2018-08-14 | End: 2018-08-14 | Stop reason: SURG

## 2018-08-14 RX ORDER — HYDROMORPHONE HCL 110MG/55ML
0.5 PATIENT CONTROLLED ANALGESIA SYRINGE INTRAVENOUS
Status: DISCONTINUED | OUTPATIENT
Start: 2018-08-14 | End: 2018-08-15 | Stop reason: HOSPADM

## 2018-08-14 RX ORDER — MEPERIDINE HYDROCHLORIDE 50 MG/ML
12.5 INJECTION INTRAMUSCULAR; INTRAVENOUS; SUBCUTANEOUS
Status: DISCONTINUED | OUTPATIENT
Start: 2018-08-14 | End: 2018-08-14 | Stop reason: HOSPADM

## 2018-08-14 RX ORDER — SENNA AND DOCUSATE SODIUM 50; 8.6 MG/1; MG/1
2 TABLET, FILM COATED ORAL NIGHTLY
Status: DISCONTINUED | OUTPATIENT
Start: 2018-08-14 | End: 2018-08-15 | Stop reason: HOSPADM

## 2018-08-14 RX ORDER — ONDANSETRON 2 MG/ML
INJECTION INTRAMUSCULAR; INTRAVENOUS AS NEEDED
Status: DISCONTINUED | OUTPATIENT
Start: 2018-08-14 | End: 2018-08-14 | Stop reason: SURG

## 2018-08-14 RX ORDER — NALOXONE HCL 0.4 MG/ML
0.2 VIAL (ML) INJECTION AS NEEDED
Status: DISCONTINUED | OUTPATIENT
Start: 2018-08-14 | End: 2018-08-14 | Stop reason: HOSPADM

## 2018-08-14 RX ADMIN — OXYCODONE HYDROCHLORIDE 10 MG: 10 TABLET, FILM COATED, EXTENDED RELEASE ORAL at 17:27

## 2018-08-14 RX ADMIN — EPHEDRINE SULFATE 10 MG: 50 INJECTION INTRAVENOUS at 08:49

## 2018-08-14 RX ADMIN — TRANEXAMIC ACID 1000 MG: 100 INJECTION, SOLUTION INTRAVENOUS at 10:31

## 2018-08-14 RX ADMIN — ACETAMINOPHEN 975 MG: 325 TABLET ORAL at 06:02

## 2018-08-14 RX ADMIN — SODIUM CHLORIDE, SODIUM GLUCONATE, SODIUM ACETATE, POTASSIUM CHLORIDE, AND MAGNESIUM CHLORIDE 1000 ML: 526; 502; 368; 37; 30 INJECTION, SOLUTION INTRAVENOUS at 06:04

## 2018-08-14 RX ADMIN — PHENYLEPHRINE HYDROCHLORIDE 100 MCG: 10 INJECTION INTRAVENOUS at 08:30

## 2018-08-14 RX ADMIN — SODIUM CHLORIDE, SODIUM GLUCONATE, SODIUM ACETATE, POTASSIUM CHLORIDE, AND MAGNESIUM CHLORIDE: 526; 502; 368; 37; 30 INJECTION, SOLUTION INTRAVENOUS at 07:54

## 2018-08-14 RX ADMIN — SODIUM CHLORIDE 75 ML/HR: 9 INJECTION, SOLUTION INTRAVENOUS at 14:37

## 2018-08-14 RX ADMIN — SUCCINYLCHOLINE CHLORIDE 180 MG: 20 INJECTION, SOLUTION INTRAMUSCULAR; INTRAVENOUS at 07:19

## 2018-08-14 RX ADMIN — PREGABALIN 75 MG: 75 CAPSULE ORAL at 06:01

## 2018-08-14 RX ADMIN — PHENYLEPHRINE HYDROCHLORIDE 200 MCG: 10 INJECTION INTRAVENOUS at 07:42

## 2018-08-14 RX ADMIN — PHENYLEPHRINE HYDROCHLORIDE 200 MCG: 10 INJECTION INTRAVENOUS at 08:25

## 2018-08-14 RX ADMIN — ONDANSETRON 4 MG: 2 INJECTION INTRAMUSCULAR; INTRAVENOUS at 10:37

## 2018-08-14 RX ADMIN — SODIUM CHLORIDE, SODIUM GLUCONATE, SODIUM ACETATE, POTASSIUM CHLORIDE, AND MAGNESIUM CHLORIDE: 526; 502; 368; 37; 30 INJECTION, SOLUTION INTRAVENOUS at 10:52

## 2018-08-14 RX ADMIN — Medication 3 MG: at 10:35

## 2018-08-14 RX ADMIN — DEXAMETHASONE SODIUM PHOSPHATE 4 MG: 4 INJECTION, SOLUTION INTRAMUSCULAR; INTRAVENOUS at 08:00

## 2018-08-14 RX ADMIN — TRANEXAMIC ACID 1000 MG: 100 INJECTION, SOLUTION INTRAVENOUS at 06:58

## 2018-08-14 RX ADMIN — ACETAMINOPHEN 1000 MG: 500 TABLET ORAL at 20:07

## 2018-08-14 RX ADMIN — ROCURONIUM BROMIDE 20 MG: 10 INJECTION INTRAVENOUS at 10:03

## 2018-08-14 RX ADMIN — FENTANYL CITRATE 100 MCG: 50 INJECTION, SOLUTION INTRAMUSCULAR; INTRAVENOUS at 07:19

## 2018-08-14 RX ADMIN — EPHEDRINE SULFATE 5 MG: 50 INJECTION INTRAVENOUS at 08:18

## 2018-08-14 RX ADMIN — CLINDAMYCIN IN 5 PERCENT DEXTROSE 900 MG: 18 INJECTION, SOLUTION INTRAVENOUS at 07:40

## 2018-08-14 RX ADMIN — OXYCODONE HYDROCHLORIDE 5 MG: 5 TABLET ORAL at 14:39

## 2018-08-14 RX ADMIN — ACETAMINOPHEN 1000 MG: 500 TABLET ORAL at 17:27

## 2018-08-14 RX ADMIN — ROCURONIUM BROMIDE 45 MG: 10 INJECTION INTRAVENOUS at 07:30

## 2018-08-14 RX ADMIN — CLINDAMYCIN IN 5 PERCENT DEXTROSE 900 MG: 18 INJECTION, SOLUTION INTRAVENOUS at 15:18

## 2018-08-14 RX ADMIN — ESCITALOPRAM 15 MG: 5 TABLET, FILM COATED ORAL at 20:07

## 2018-08-14 RX ADMIN — ROCURONIUM BROMIDE 30 MG: 10 INJECTION INTRAVENOUS at 09:05

## 2018-08-14 RX ADMIN — MIDAZOLAM 2 MG: 1 INJECTION INTRAMUSCULAR; INTRAVENOUS at 07:11

## 2018-08-14 RX ADMIN — CALCIUM CHLORIDE 1 G: 100 INJECTION, SOLUTION INTRAVENOUS at 10:00

## 2018-08-14 RX ADMIN — HYDROMORPHONE HYDROCHLORIDE 0.5 MG: 2 INJECTION INTRAMUSCULAR; INTRAVENOUS; SUBCUTANEOUS at 10:37

## 2018-08-14 RX ADMIN — OXYCODONE HYDROCHLORIDE 10 MG: 10 TABLET, FILM COATED, EXTENDED RELEASE ORAL at 06:02

## 2018-08-14 RX ADMIN — FENTANYL CITRATE 150 MCG: 50 INJECTION, SOLUTION INTRAMUSCULAR; INTRAVENOUS at 07:25

## 2018-08-14 RX ADMIN — ROCURONIUM BROMIDE 5 MG: 10 INJECTION INTRAVENOUS at 07:19

## 2018-08-14 RX ADMIN — PROPOFOL 150 MG: 10 INJECTION, EMULSION INTRAVENOUS at 07:19

## 2018-08-14 RX ADMIN — SODIUM CHLORIDE, SODIUM GLUCONATE, SODIUM ACETATE, POTASSIUM CHLORIDE, AND MAGNESIUM CHLORIDE: 526; 502; 368; 37; 30 INJECTION, SOLUTION INTRAVENOUS at 07:30

## 2018-08-14 RX ADMIN — PHENYLEPHRINE HYDROCHLORIDE 100 MCG: 10 INJECTION INTRAVENOUS at 07:32

## 2018-08-14 RX ADMIN — GLYCOPYRROLATE 0.4 MG: 0.2 INJECTION, SOLUTION INTRAMUSCULAR; INTRAVENOUS at 10:35

## 2018-08-14 RX ADMIN — SENNOSIDES AND DOCUSATE SODIUM 2 TABLET: 8.6; 5 TABLET ORAL at 20:06

## 2018-08-14 RX ADMIN — LIDOCAINE HYDROCHLORIDE 100 MG: 20 INJECTION, SOLUTION INFILTRATION; PERINEURAL at 07:19

## 2018-08-14 RX ADMIN — ROCURONIUM BROMIDE 30 MG: 10 INJECTION INTRAVENOUS at 08:20

## 2018-08-14 RX ADMIN — HYDROMORPHONE HYDROCHLORIDE 0.5 MG: 2 INJECTION INTRAMUSCULAR; INTRAVENOUS; SUBCUTANEOUS at 11:07

## 2018-08-14 RX ADMIN — FAMOTIDINE 40 MG: 40 TABLET ORAL at 14:39

## 2018-08-14 RX ADMIN — PHENYLEPHRINE HYDROCHLORIDE 200 MCG: 10 INJECTION INTRAVENOUS at 07:53

## 2018-08-14 RX ADMIN — SODIUM CHLORIDE, SODIUM GLUCONATE, SODIUM ACETATE, POTASSIUM CHLORIDE, AND MAGNESIUM CHLORIDE: 526; 502; 368; 37; 30 INJECTION, SOLUTION INTRAVENOUS at 08:49

## 2018-08-14 RX ADMIN — OXYCODONE HYDROCHLORIDE 5 MG: 5 TABLET ORAL at 20:16

## 2018-08-14 RX ADMIN — FENTANYL CITRATE 100 MCG: 50 INJECTION, SOLUTION INTRAMUSCULAR; INTRAVENOUS at 09:14

## 2018-08-14 RX ADMIN — ATORVASTATIN CALCIUM 40 MG: 40 TABLET, FILM COATED ORAL at 14:38

## 2018-08-14 NOTE — PLAN OF CARE
Problem: Patient Care Overview  Goal: Plan of Care Review  Outcome: Ongoing (interventions implemented as appropriate)   08/14/18 7813   Coping/Psychosocial   Plan of Care Reviewed With patient;spouse   OTHER   Outcome Summary OT evaluation completed today. Pt is a 46 y/o M s/p right THR posterior approach. Pt completed bed mobility with min A, sit <> stand t/f with min A with FWW, min distance functional ambulation with CGA with FWW. Pt required min VC to adhere to hip precautions. Pt could benefit from skilled OT services to address decreased strength, activity tolerance, transfers, functional mobility, AD/AE utilization and independence with ADLs. Recommend pt d/c home with assist and OP PT services.

## 2018-08-14 NOTE — THERAPY EVALUATION
Acute Care - Physical Therapy Initial Evaluation  [facility]     Patient Name: Jamey Berry  : 1970  MRN: [id number]  Today's Date: 2018   Onset of Illness/Injury or Date of Surgery: 18  Date of Referral to PT: 18  Referring Physician: Luiz Durham MD      Admit Date: 2018    Visit Dx:     ICD-10-CM ICD-9-CM   1. Impaired functional mobility, balance, gait, and endurance Z74.09 V49.89   2. Post-traumatic osteoarthritis of right hip M16.51 715.25   3. Impaired mobility and activities of daily living Z74.09 799.89     Patient Active Problem List   Diagnosis   • Panic disorder   • Morbid obesity (CMS/HCC)   • Generalized anxiety disorder   • Elevated blood pressure reading without diagnosis of hypertension   • Depressive disorder   • Pain in lower limb   • Right hip pain   • Closed displaced fracture of medial malleolus of right tibia   • Post-traumatic osteoarthritis of right hip   • Closed left subtrochanteric femur fracture, with routine healing, subsequent encounter     Past Medical History:   Diagnosis Date   • Arthritis    • Chest pain    • Depressive disorder    • Dyspnea    • Elevated blood pressure reading without diagnosis of hypertension    • Generalized anxiety disorder    • Hip pain     ortho recommended replacement   • Hyperlipidemia    • Keloid scar     r/o cancer   • Morbid obesity (CMS/HCC)    • Need for immunization against influenza    • Pain in lower limb     right post fx/phong   • Panic disorder    • Skin cancer     basal cell removed from scalp   • Sleep apnea      Past Surgical History:   Procedure Laterality Date   • ORIF FEMUR FRACTURE      hip and ankle done at same time secondary to trauma        PT ASSESSMENT (last 12 hours)      Physical Therapy Evaluation     Row Name 18 1336          PT Evaluation Time/Intention    Subjective Information complains of;pain  -KW     Document Type evaluation  -KW     Mode of Treatment individual  therapy;physical therapy  -KW     Patient Effort excellent  -KW     Symptoms Noted During/After Treatment none  -KW     Row Name 08/14/18 1336          General Information    Patient Profile Reviewed? yes  -KW     Onset of Illness/Injury or Date of Surgery 08/14/18  -KW     Referring Physician Luiz Durham MD  -KW     Patient Observations alert;cooperative;agree to therapy  -KW     Patient/Family Observations wife present during eval  -KW     General Observations of Patient Pt on IV, Bilateral SCDs, 5L supplemental O2 via NC, and had drain on R side  -KW     Equipment Currently Used at Home none   currently has rollator, bath bench, commode, hospital bed  -KW     Existing Precautions/Restrictions hip, posterior;fall  -KW     Equipment Issued to Patient gait belt  -KW     Risks Reviewed patient:;spouse/S.O.:;nausea/vomiting;dizziness;increased discomfort;change in vital signs  -KW     Benefits Reviewed patient:;spouse/S.O.:;improve function;increase independence;increase strength;increase balance;decrease pain  -KW     Row Name 08/14/18 1336          Relationship/Environment    Lives With significant other;child(sumanth), dependent  -KW     Row Name 08/14/18 1336          Resource/Environmental Concerns    Current Living Arrangements home/apartment/condo  -KW     Resource/Environmental Concerns none  -KW     Row Name 08/14/18 1336          Cognitive Assessment/Interventions    Additional Documentation Cognitive Assessment/Intervention (Group)  -KW     Row Name 08/14/18 1336          Cognitive Assessment/Intervention- PT/OT    Affect/Mental Status (Cognitive) WNL  -KW     Orientation Status (Cognition) oriented x 4  -KW     Follows Commands (Cognition) WNL  -KW     Row Name 08/14/18 1336          Mobility Assessment/Treatment    Extremity Weight-bearing Status right lower extremity  -KW     Right Lower Extremity (Weight-bearing Status) weight-bearing as tolerated (WBAT)  -KW     Row Name 08/14/18 1336          Bed  Mobility Assessment/Treatment    Bed Mobility Assessment/Treatment rolling right;supine-sit  -KW     Rolling Right [place] (Bed Mobility) minimum assist (75% patient effort)  -KW     Supine-Sit [place] (Bed Mobility) minimum assist (75% patient effort)  -KW     Assistive Device (Bed Mobility) bed rails  -KW     Row Name 08/14/18 1336          Transfer Assessment/Treatment    Transfer Assessment/Treatment sit-stand transfer;stand-sit transfer  -KW     Maintains Weight-bearing Status (Transfers) able to maintain  -KW     Sit-Stand [place] (Transfers) minimum assist (75% patient effort);verbal cues  -KW     Stand-Sit [place] (Transfers) minimum assist (75% patient effort);verbal cues  -KW     Row Name 08/14/18 1336          Sit-Stand Transfer    Assistive Device (Sit-Stand Transfers) walker, front-wheeled  -KW     Row Name 08/14/18 1336          Stand-Sit Transfer    Assistive Device (Stand-Sit Transfers) walker, front-wheeled  -KW     Row Name 08/14/18 1336          Gait/Stairs Assessment/Training    Gait/Stairs Assessment/Training gait/ambulation assistive device;maintains weight-bearing status  -KW     [place] Level (Gait) minimum assist (75% patient effort);verbal cues  -KW     Assistive Device (Gait) walker, front-wheeled  -KW     Distance in Feet (Gait) 12  -KW     Pattern (Gait) 3-point  -KW     Row Name 08/14/18 1336          General ROM    GENERAL ROM COMMENTS B UE and LLE WFL; RLE restricted d/t recent surgery  -KW     Row Name 08/14/18 1336          MMT (Manual Muscle Testing)    Additional Documentation General Assessment (Manual Muscle Testing) (Group)  -KW     Row Name 08/14/18 1336          General Assessment (Manual Muscle Testing)    General Manual Muscle Testing (MMT) Assessment lower extremity strength deficits identified  -KW     Comment, General Manual Muscle Testing (MMT) Assessment please see OT eval for UE evaluation  -KW     Row Name 08/14/18 1336          Lower  Extremity (Manual Muscle Testing)    Comment, MMT: Lower Extremity hip flexion L 4+/5; knee flexion and extension L 4+/5; ankle DF/PF 5/5; RLE MMT not formally assessed d/t recent MIREILLE  -KW     Row Name 08/14/18 1336          Vision Assessment/Intervention    Visual Impairment/Limitations corrective lenses for reading  -KW     Row Name 08/14/18 1336          Pain Assessment    Additional Documentation Pain Scale: Numbers Pre/Post-Treatment (Group)  -KW     Row Name 08/14/18 1336          Pain Scale: Numbers Pre/Post-Treatment    Pain Scale: Numbers, Pretreatment 4/10  -KW     Pain Scale: Numbers, Post-Treatment 4/10  -KW     Pain Location - Side Right  -KW     Pain Location - Orientation generalized  -KW     Pain Location hip  -KW     Pre/Post Treatment Pain Comment pt reports decreased pain after evaluation   -KW     Row Name             Wound 08/14/18 1028 Right hip surgical    Wound - Properties Group Date first assessed: 08/14/18  -KWA Time first assessed: 1028  -KWA Present On Admission : no  -KWA Side: Right  -KWA Location: hip  -KWA Type: surgical  -KWA    Row Name 08/14/18 1336          Plan of Care Review    Plan of Care Reviewed With patient;spouse  -KW     Row Name 08/14/18 1336          Physical Therapy Clinical Impression    Date of Referral to PT 08/14/18  -KW     PT Diagnosis (PT Clinical Impression) Impaired functional mobility, gait, endurance, and balance   -KW     Criteria for Skilled Interventions Met (PT Clinical Impression) yes;treatment indicated  -KW     Pathology/Pathophysiology Noted (Describe Specifically for Each System) musculoskeletal  -KW     Impairments Found (describe specific impairments) aerobic capacity/endurance;muscle performance;gait, locomotion, and balance;ROM  -KW     Functional Limitations in Following Categories (Describe Specific Limitations) self-care;home management;work;community/leisure  -KW     Disability: Inability to Perform Actions/Activities of Required Roles  (describe specific disability) work;community/leisure  -KW     Rehab Potential (PT Clinical Summary) good, to achieve stated therapy goals  -KW     Predicted Duration of Therapy (PT) until goals met or discharged from hospital   -KW     Care Plan Review (PT) evaluation/treatment results reviewed;care plan/treatment goals reviewed;risks/benefits reviewed  -KW     Care Plan Review, Other Participant (PT Clinical Impression) significant other  -KW     Row Name 08/14/18 1336          Vital Signs    Pre Systolic BP Rehab 149  -KW     Pre Treatment Diastolic BP 89  -KW     Post Systolic BP Rehab 121  -KW     Post Treatment Diastolic BP 97  -KW     Pretreatment Heart Rate (beats/min) 121  -KW     Intratreatment Heart Rate (beats/min) 150  -KW     Posttreatment Heart Rate (beats/min) 145  -KW     Pre SpO2 (%) 88  -KW     O2 Delivery Pre Treatment supplemental O2   5L O2 via NC  -KW     Intra SpO2 (%) 94  -KW     O2 Delivery Intra Treatment supplemental O2  -KW     Post SpO2 (%) 95  -KW     O2 Delivery Post Treatment supplemental O2  -KW     Pre Patient Position Supine  -KW     Intra Patient Position Standing  -KW     Post Patient Position Sitting  -KW     Row Name 08/14/18 1336          Physical Therapy Goals    Bed Mobility Goal Selection (PT) bed mobility, PT goal 1  -KW     Transfer Goal Selection (PT) transfer, PT goal 1  -KW     Gait Training Goal Selection (PT) gait training, PT goal 1  -KW     Stairs Goal Selection (PT) stairs, PT goal 1  -KW     Additional Documentation Stairs Goal Selection (PT) (Row)  -KW     Row Name 08/14/18 1336          Bed Mobility Goal 1 (PT)    Activity/Assistive Device (Bed Mobility Goal 1, PT) rolling to left;rolling to right;sit to supine/supine to sit  -KW     Power Level/Cues Needed (Bed Mobility Goal 1, PT) independent  -KW     Time Frame (Bed Mobility Goal 1, PT) 3 days;short term goal (STG)  -KW     Progress/Outcomes (Bed Mobility Goal 1, PT) continuing progress toward  goal;goal not met  -KW     Row Name 08/14/18 1336          Transfer Goal 1 (PT)    Activity/Assistive Device (Transfer Goal 1, PT) sit-to-stand/stand-to-sit;bed-to-chair/chair-to-bed;walker, rolling  -KW     [place] Level/Cues Needed (Transfer Goal 1, PT) conditional independence  -KW     Time Frame (Transfer Goal 1, PT) short term goal (STG);3 days  -KW     Progress/Outcome (Transfer Goal 1, PT) continuing progress toward goal;goal not met  -KW     Row Name 08/14/18 1336          Gait Training Goal 1 (PT)    Activity/Assistive Device (Gait Training Goal 1, PT) gait (walking locomotion);assistive device use;decrease fall risk;walker, rolling  -KW     [place] Level (Gait Training Goal 1, PT) conditional independence  -KW     Distance (Gait Goal 1, PT) 150 ft or more  -KW     Time Frame (Gait Training Goal 1, PT) 5 days  -KW     Progress/Outcome (Gait Training Goal 1, PT) continuing progress toward goal;goal not met  -KW     Row Name 08/14/18 1336          Stairs Goal 1 (PT)    Activity/Assistive Device (Stairs Goal 1, PT) ascending stairs;descending stairs;using handrail, right;using handrail, left  -KW     [place] Level/Cues Needed (Stairs Goal 1, PT) conditional independence  -KW     Number of Stairs (Stairs Goal 1, PT) 12  -KW     Time Frame (Stairs Goal 1, PT) 10 days  -KW     Progress/Outcome (Stairs Goal 1, PT) continuing progress toward goal;goal not met  -KW     Row Name 08/14/18 1336          Patient Education Goal (PT)    Activity (Patient Education Goal, PT) HEP   -KW     [place]/Cues/Accuracy (Memory Goal 2, PT) demonstrates adequately  -KW     Time Frame (Patient Education Goal, PT) by discharge  -KW     Progress/Outcome (Patient Education Goal, PT) continuing progress toward goal;goal not met  -KW     Row Name 08/14/18 1336          Positioning and Restraints    Pre-Treatment Position in bed  -KW     Post Treatment Position chair  -KW     In Chair notified nsg;reclined;call light  within reach;encouraged to call for assist;with family/caregiver  -KW       User Key  (r) = Recorded By, (t) = Taken By, (c) = Cosigned By    Initials Name Provider Type    Denise Green RN Registered Nurse    Pricilla Shelton, PT Physical Therapist          Physical Therapy Education     Title: PT OT SLP Therapies (Active)     Topic: Physical Therapy (Active)     Point: Mobility training (Done)    Learning Progress Summary     Learner Status Readiness Method Response Comment Documented by    Patient Done Acceptance E,D VU,NR Hip precautions reviewed to avoid hip flexion past 90 deg, hip IR, and hip adduction  08/14/18 1511    Significant Other Done Acceptance E,D VU,NR Hip precautions reviewed to avoid hip flexion past 90 deg, hip IR, and hip adduction  08/14/18 1511          Point: Precautions (Done)    Learning Progress Summary     Learner Status Readiness Method Response Comment Documented by    Patient Done Acceptance E,D VU,NR Hip precautions reviewed to avoid hip flexion past 90 deg, hip IR, and hip adduction  08/14/18 1511    Significant Other Done Acceptance E,D VU,NR Hip precautions reviewed to avoid hip flexion past 90 deg, hip IR, and hip adduction  08/14/18 1511                      User Key     Initials Effective Dates Name Provider Type Discipline     07/23/18 -  Pricilla Payne, FERNIE Physical Therapist PT                PT Recommendation and Plan  Anticipated Discharge Disposition (PT): home with assist, home with OP services  Planned Therapy Interventions (PT Eval): balance training, bed mobility training, gait training, home exercise program, strengthening, ROM (range of motion), transfer training  Therapy Frequency (PT Clinical Impression): other (see comments) (5-14x/week)  Outcome Summary/Treatment Plan (PT)  Anticipated Discharge Disposition (PT): home with assist, home with OP services  Plan of Care Reviewed With: patient, spouse  Outcome Summary: PT evaluation received and  completed. Pt agreeable to therapy. Hip precautions reviewed with patient and patient's wife. Patient able to peform sit to stand Kimmy and ambulate 12 ft Kimmy with front wheeled walker. Pt would benefit from further skilled PT to address endurance, gait, strength, and ROM deficits.            Outcome Measures     Row Name 08/14/18 1337 08/14/18 1336          How much help from another person do you currently need...    Turning from your back to your side while in flat bed without using bedrails?  -- 3  -KW     Moving from lying on back to sitting on the side of a flat bed without bedrails?  -- 3  -KW     Moving to and from a bed to a chair (including a wheelchair)?  -- 3  -KW     Standing up from a chair using your arms (e.g., wheelchair, bedside chair)?  -- 3  -KW     Climbing 3-5 steps with a railing?  -- 2  -KW     To walk in hospital room?  -- 3  -KW     AM-[facility] 6 Clicks Score  -- 17  -KW        How much help from another is currently needed...    Putting on and taking off regular lower body clothing? 2  -MR  --     Bathing (including washing, rinsing, and drying) 2  -MR  --     Toileting (which includes using toilet bed pan or urinal) 2  -MR  --     Putting on and taking off regular upper body clothing 4  -MR  --     Taking care of personal grooming (such as brushing teeth) 4  -MR  --     Eating meals 4  -MR  --     Score 18  -MR  --        Functional Assessment    Outcome Measure Options AM-PAC 6 Clicks Daily Activity (OT)  - AM-[facility] 6 Clicks Basic Mobility (PT)  -KW       User Key  (r) = Recorded By, (t) = Taken By, (c) = Cosigned By    Initials Name Provider Type    MR Jackman Beth LAURA, OT Occupational Therapist    KW Pricilla Payne, PT Physical Therapist           Time Calculation:         PT Charges     Row Name 08/14/18 0288             Time Calculation    Start Time 1336  -KW      Stop Time 1411  -KW      Time Calculation (min) 35 min  -KW      PT Received On 08/14/18  -KW      PT Goal Re-Cert Due Date  08/20/18  -ARIADNA        User Key  (r) = Recorded By, (t) = Taken By, (c) = Cosigned By    Initials Name Provider Type    Pricilla Shelton, PT Physical Therapist        Therapy Suggested Charges     Code   Minutes Charges    None           Therapy Charges for Today     Code Description Service Date Service Provider Modifiers Qty    69304611023 HC PT MOBILITY CURRENT 8/14/2018 Pricilla Payne, PT GP, CK 1    89967691359 HC PT MOBILITY PROJECTED 8/14/2018 Pricilla Payne, PT GP, CI 1    03197460832 HC PT EVAL MOD COMPLEXITY 2 8/14/2018 Pricilla Payne, PT GP 1          PT G-Codes  PT Professional Judgement Used?: Yes  Outcome Measure Options: AM-PAC 6 Clicks Basic Mobility (PT)  Score: 17  Functional Limitation: Mobility: Walking and moving around  Mobility: Walking and Moving Around Current Status (): At least 40 percent but less than 60 percent impaired, limited or restricted  Mobility: Walking and Moving Around Goal Status (): At least 1 percent but less than 20 percent impaired, limited or restricted      Pricilla Payne PT  8/14/2018

## 2018-08-14 NOTE — ANESTHESIA PREPROCEDURE EVALUATION
Anesthesia Evaluation     Patient summary reviewed   NPO Solid Status: > 8 hours  NPO Liquid Status: > 8 hours           Airway   Mallampati: II  TM distance: >3 FB  Neck ROM: full  No difficulty expected  Dental    (+) poor dentition    Pulmonary     breath sounds clear to auscultation  (+) a smoker Former, shortness of breath, sleep apnea,   Cardiovascular - normal exam  Exercise tolerance: good (4-7 METS)    (+) hyperlipidemia,       Neuro/Psych  (+) psychiatric history Anxiety,     GI/Hepatic/Renal/Endo    (+) obesity, morbid obesity, GERD well controlled,      Musculoskeletal     Abdominal   (+) obese,     Abdomen: soft.   Substance History      OB/GYN          Other   (+) arthritis   history of cancer                    Anesthesia Plan    ASA 3     general   (Explained the prone position to the patient, the careful protection of the eyes and nose and of course the general anesthetic approach to todays procedure.)  intravenous induction   Anesthetic plan and risks discussed with patient.

## 2018-08-14 NOTE — ANESTHESIA PROCEDURE NOTES
Arterial Line    Patient location during procedure: OR   Line placed for hemodynamic monitoring and ABGs/Labs/ISTAT.  Performed By   Anesthesiologist: TRI BARRERA  Preanesthetic Checklist  Completed: patient identified  Arterial Line Prep   Sterile Tech: gloves, mask and cap  Prep: ChloraPrep  Patient monitoring: blood pressure monitoring, continuous pulse oximetry and EKG  Arterial Line Procedure   Laterality:left  Location:  radial artery  Catheter size: 20 G   Guidance: landmark technique and palpation technique  Number of attempts: 2         Post Assessment   Dressing Type: occlusive dressing applied.   Complications no  Patient Tolerance: patient tolerated the procedure well with no apparent complications

## 2018-08-14 NOTE — PLAN OF CARE
Problem: Patient Care Overview  Goal: Plan of Care Review  Outcome: Ongoing (interventions implemented as appropriate)   08/14/18 6471   Coping/Psychosocial   Plan of Care Reviewed With patient;spouse   OTHER   Outcome Summary PT evaluation received and completed. Pt agreeable to therapy. Hip precautions reviewed with patient and patient's wife. Patient able to peform sit to stand Kimmy and ambulate 12 ft Kimmy with front wheeled walker. Pt would benefit from further skilled PT to address endurance, gait, strength, and ROM deficits.

## 2018-08-14 NOTE — ANESTHESIA POSTPROCEDURE EVALUATION
Patient: Jamey Berry    Procedure Summary     Date:  08/14/18 Room / Location:  [facility] OR  / [facility] OR    Anesthesia Start:  0713 Anesthesia Stop:  1134    Procedures:       HARDWARE REMOVAL RIGHT FEMUR (Right Hip)      HARDWARE REMOVAL RIGHT HIP (Right Thigh)      TOTAL HIP REPLACEMENT POSTERIOR APPROACH (Right Hip) Diagnosis:       Post-traumatic osteoarthritis of right hip      (Post-traumatic osteoarthritis of right hip [M16.51])    Surgeon:  Luiz Durham MD Provider:  Adams Morrison MD    Anesthesia Type:  general ASA Status:  3          Anesthesia Type: general  Last vitals  BP   118/72 (08/14/18 0552)   Temp   98.2 °F (36.8 °C) (08/14/18 0552)   Pulse   95 (08/14/18 0552)   Resp   18 (08/14/18 0552)     SpO2   96 % (08/14/18 0552)     Post Anesthesia Care and Evaluation    Patient location during evaluation: PACU  Patient participation: complete - patient participated  Level of consciousness: sleepy but conscious  Pain management: adequate  Airway patency: patent  Anesthetic complications: No anesthetic complications  PONV Status: none  Cardiovascular status: acceptable  Respiratory status: acceptable, oral airway and face mask  Hydration status: acceptable

## 2018-08-14 NOTE — OP NOTE
Procedure(s):  HARDWARE REMOVAL RIGHT FEMUR  HARDWARE REMOVAL RIGHT HIP  TOTAL HIP REPLACEMENT POSTERIOR APPROACH  Procedure Note  Internal fixation right femur  Jamey Berry  8/14/2018    Pre-op Diagnosis:   Post-traumatic osteoarthritis of right hip [M16.51]    Post-op Diagnosis:     Post-Op Diagnosis Codes:     * Post-traumatic osteoarthritis of right hip [M16.51]    Procedure/CPT® Codes:      Procedure(s):  1.HARDWARE REMOVAL RIGHT FEMUR  2.HARDWARE REMOVAL RIGHT HIP  3.TOTAL HIP REPLACEMENT POSTERIOR APPROACH  4.  Internal fixation right femur  Surgeon(s):  Luiz Durham MD    Anesthesia: General    Staff:   Circulator: Denise Mcmanus RN  Radiology Technologist: Nadeen Robin  Scrub Person: Bridget Beck  Assistant: Troy Germain; Suzan Marte MA    Estimated Blood Loss: 600 mL    Specimens:                ID Type Source Tests Collected by Time   1 :  Wound Hip, Right WOUND CULTURE Luiz Durham MD 8/14/2018 0904   2 :  Arterial Blood Blood, Arterial Line BLOOD GAS, ARTERIAL W/CO-OXIMETRY Luiz Durham MD 8/14/2018 0946   A : femoral head right hip Tissue Hip, Right TISSUE PATHOLOGY EXAM Luiz Durham MD 8/14/2018 0903   B : old hardware right hip Tissue Hip, Right TISSUE PATHOLOGY EXAM Luiz Durham MD 8/14/2018 1107         Drains:      Findings: Avascular necrosis femoral head/status post pinning right femoral neck/status post plating right femoral shaft      Complications: None    Dictation: Indications: A 47-year-old 23 years status post traumatic injury to the right femur involving femoral neck fracture as well as a femoral shaft fracture.  He was treated with a long femoral shaft plate as well as per open pinning of the femoral neck.  He has had pain for quite some time and has avascular necrosis complete collapse of femoral head with probably 2-3 cm of shortening of the femur on the right side.  He is failed conservative treatment  including injections, medications, activity modifications, therapeutic exercise, mobility aids etc.  He is for hip replacement at this time.  Risks and benefits explained to him in detail including but not limited to bleeding, blood clot, fracture, hardware failure, dislocation, neurovascular injury, medical anesthetic complications, limb length inequality, etc. they understand we'll willing to go ahead and proceed as planned.  After detailed informed consent is given.    Procedure: After adequate general anesthesia obtained patient's placed right side up lateral with appropriate padding precautions.  Hip was prepped and draped usual sterile fashion down to the knee.  Timeout was performed prior to treatment.  The old incision was used current posterior laterally over the femur sharp dissection is carried down to the fat and subcutaneous tissue down to the tensor fascia which was split in line with the skin incision.  The screws in the femoral neck were located under the insertion of the vastus lateralis and removed with a power.  Likewise the vastus lateralis was split through the fascia the posterior portion which revealing the femoral plate.  The femoral plate had been overgrown by periosteal bone.  The top 5 screws were removed and the plate.  Because the stress risers created here with removal the screws the plan was to get the prosthesis down and also at internal fixation securing the plate to the shaft of the bone proximally with a cable.  A 2 mm cable was then passed after using the tissue passer cable was tensioned to 50 N and then crimped into place.  Instability was noted here.  There is irrigated) saline solution.    Next was a external rotators taken off the proximal femur there is a residual deformity of the proximal femur from his old fracture.  As rotators were taken off followed by teeing the capsule.  Marked spurring and osteophytes were noted here.  The hip was dislocated and a femoral neck cut  was made about 9 mm proximal to the lesser trochanter.  The gluteal insertion was taken off to gain some mobility the proximal femur.  After making the cut therapy was contacted anteriorly the labrum and osteophytes were taken out circumferentially off of the socket.  The socket was reamed up to 51 mm and good bleeding bone.  Next soft tissue was taken off the femur Kwame was used to taken off spurring and excess bone.  With a reamer the femoral shaft was entered sharply this was followed by a flexible guidewire flexible reaming was taken up down the femoral shaft.  When the femoral shaft of the [place] stem revision was used with a longer stem that would go distal to where the plate was.  This was planned to avoid potential stress riser.  Broaching was taken up to a 14 x-ray C-arm was used to confirm we were in the right position and that enough screws had been removed distally and otherwise were in place.  We also used this to confirm position of the cup.    A 52 cup was then impacted in place in 35-40° of abduction with 25° of anteversion.  A trial was inserted here.  Broaching was taken to 14 a 14 trial was inserted and reduced.  Digital x-rays used to take an x-ray.  We felt like there was some length here although initial clinically the center of rotation was a little beyond the tip of the trochanter I because of acquired varus deformity.  In a fair amount of varus in the opposite side.  We decided to use a neutral +0 cup which was impacted that would minimize and give us perhaps 2 more millimeters of length here.  We countersunk the broach and re-reamed the calcar to get another 2 mm of length.    There is irrigated) saline solution and a revision 14 stem was acted into place.  No fractures were noted.  Again trialing with a -2 head gave excellent stability and felt like the length was adequate.  The head ball was then impacted again reduced with full range of motion with good stability.  There is irrigated  with pulsatile lavage copious amounts of saline solution.  The deep fascia of the vastus was repaired with interrupted 0 Vicryl.  A medium Hemovac drain was inserted.  Tensor fascia closed with interrupted #1 Vicryl.  Subcutaneous tissue with a combination of 0 and 2-0 Vicryl interrupted fashion skin closed with staples.  Sterile dressings were applied he tolerated the procedure were no complications end of dictation thank you    Luiz Durham MD  08/14/18  11:18 AM

## 2018-08-14 NOTE — ANESTHESIA PROCEDURE NOTES
Peripheral IV    Patient location during procedure: OR  Line placed for Fluids/Medication Admin.  Performed By   CRNA: WILLIAM CRESPO  Peripheral IV Prep   Patient position: supine   Prep: ChloraPrep and alcohol swabs  Patient monitoring: heart rate, cardiac monitor and continuous pulse ox  Peripheral IV Procedure   Laterality:left  Location:  Wrist  Catheter size: 18 G         Post Assessment   Dressing Type: tape and transparent.    IV Dressing/Site: clean, dry and intact

## 2018-08-14 NOTE — H&P
Patient Care Team:  Erik Sood MD as PCP - General    Chief complaint R hip pain    Subjective     Long h/o R hip pain s/p trauma , now with AVN and intractable pain, for THR conversion        Review of Systems   Constitutional: Positive for activity change. Negative for chills and fever.   HENT: Negative for facial swelling.    Eyes: Negative for photophobia.   Respiratory: Negative for apnea and shortness of breath.    Cardiovascular: Negative for chest pain and leg swelling.   Gastrointestinal: Negative for abdominal pain, nausea and vomiting.   Genitourinary: Negative for dysuria.   Musculoskeletal: Positive for arthralgias and gait problem.   Skin: Negative for color change and rash.   Neurological: Negative for seizures and syncope.   Psychiatric/Behavioral: Negative for behavioral problems and dysphoric mood.        Past Medical History:   Diagnosis Date   • Arthritis    • Chest pain    • Depressive disorder    • Dyspnea    • Elevated blood pressure reading without diagnosis of hypertension    • Generalized anxiety disorder    • Hip pain     ortho recommended replacement   • Hyperlipidemia    • Keloid scar     r/o cancer   • Morbid obesity (CMS/HCC)    • Need for immunization against influenza    • Pain in lower limb     right post fx/phong   • Panic disorder    • Skin cancer     basal cell removed from scalp   • Sleep apnea        Objective      Vital Signs  Temp:  [98.2 °F (36.8 °C)] 98.2 °F (36.8 °C)  Heart Rate:  [95] 95  Resp:  [18] 18  BP: (118)/(72) 118/72    Physical Exam   Constitutional: He is oriented to person, place, and time. He appears well-developed and well-nourished.   HENT:   Head: Normocephalic and atraumatic.   Eyes: Pupils are equal, round, and reactive to light. EOM are normal.   Neck: Neck supple. No tracheal deviation present.   Pulmonary/Chest: Effort normal.   Musculoskeletal: He exhibits tenderness. He exhibits no edema or deformity.   Neurological: He is alert and  oriented to person, place, and time.   Skin: Skin is warm and dry. No erythema.   Psychiatric: He has a normal mood and affect. Thought content normal.   Vitals reviewed.  1-2 cm short on right, NVI, long healed scar laterally, decreased ROM    Results Review:   I reviewed the patient's new clinical results.      Assessment/Plan     Principal Problem:    Post-traumatic osteoarthritis of right hip  Active Problems:    Right hip pain      Assessment:    Condition: In stable condition.  Unchanged.   (For THR tosay, risks and benefits ezplained again in detail discussed again includeing infection, NV injury, fracture,bleeding, blood clots, medical or anesthetic complication, leg length dicrepancy, etc. We will proceed as planned).       I discussed the patients findings and my recommendations with patient    Luiz Durham MD  08/14/18  7:06 AM    Time: More than 50% of time spent in counseling and coordination of care:  Total face-to-face/floor time 30 min.  Time spent in counseling 30 min. Counseling included the following topics: see above

## 2018-08-14 NOTE — ANESTHESIA PROCEDURE NOTES
Arterial Line    Patient location during procedure: OR  Start time: 8/14/2018 9:00 AM  Stop Time:8/14/2018 9:06 AM       Line placed for hemodynamic monitoring.  Performed By   Anesthesiologist: TRI BARRERA  Arterial Line Prep   Sterile Tech: gloves, cap and mask  Prep: ChloraPrep  Patient monitoring: blood pressure monitoring, continuous pulse oximetry and EKG  Arterial Line Procedure   Laterality:left  Location:  radial artery   Guidance: palpation technique  Number of attempts: 2  Successful placement: yes          Post Assessment   Dressing Type: secured with tape and occlusive dressing applied.   Complications no  Circ/Move/Sens Assessment: normal.   Patient Tolerance: patient tolerated the procedure well with no apparent complications

## 2018-08-14 NOTE — ANESTHESIA PROCEDURE NOTES
Airway  Urgency: elective    Airway not difficult    General Information and Staff    Patient location during procedure: OR  CRNA: WILLIAM CRESPO    Indications and Patient Condition  Indications for airway management: airway protection    Preoxygenated: yes  Mask difficulty assessment: 2 - vent by mask + OA or adjuvant +/- NMBA    Final Airway Details  Final airway type: endotracheal airway      Successful airway: ETT  Cuffed: yes   Successful intubation technique: direct laryngoscopy  Endotracheal tube insertion site: oral  Blade: Elva  Blade size: #4  ETT size: 7.5 mm  Cormack-Lehane Classification: grade I - full view of glottis  Placement verified by: chest auscultation and capnometry   Cuff volume (mL): 10  Measured from: lips  ETT to lips (cm): 22  Number of attempts at approach: 1

## 2018-08-14 NOTE — THERAPY EVALUATION
Acute Care - Occupational Therapy Initial Evaluation  [facility]     Patient Name: Jamey Berry  : 1970  MRN: [id number]  Today's Date: 2018  Onset of Illness/Injury or Date of Surgery: 18  Date of Referral to OT: 18  Referring Physician: Luiz Durham MD    Admit Date: 2018       ICD-10-CM ICD-9-CM   1. Impaired functional mobility, balance, gait, and endurance Z74.09 V49.89   2. Post-traumatic osteoarthritis of right hip M16.51 715.25   3. Impaired mobility and activities of daily living Z74.09 799.89     Patient Active Problem List   Diagnosis   • Panic disorder   • Morbid obesity (CMS/HCC)   • Generalized anxiety disorder   • Elevated blood pressure reading without diagnosis of hypertension   • Depressive disorder   • Pain in lower limb   • Right hip pain   • Closed displaced fracture of medial malleolus of right tibia   • Post-traumatic osteoarthritis of right hip   • Closed left subtrochanteric femur fracture, with routine healing, subsequent encounter     Past Medical History:   Diagnosis Date   • Arthritis    • Chest pain    • Depressive disorder    • Dyspnea    • Elevated blood pressure reading without diagnosis of hypertension    • Generalized anxiety disorder    • Hip pain     ortho recommended replacement   • Hyperlipidemia    • Keloid scar     r/o cancer   • Morbid obesity (CMS/HCC)    • Need for immunization against influenza    • Pain in lower limb     right post fx/phong   • Panic disorder    • Skin cancer     basal cell removed from scalp   • Sleep apnea      Past Surgical History:   Procedure Laterality Date   • ORIF FEMUR FRACTURE      hip and ankle done at same time secondary to trauma          OT ASSESSMENT FLOWSHEET (last 72 hours)      Occupational Therapy Evaluation     Row Name 18 1337                   OT Evaluation Time/Intention    Subjective Information complains of;pain  -MR        Document Type evaluation  -MR        Mode of  Treatment co-treatment;occupational therapy;physical therapy  -MR        Total Evaluation Minutes, Occupational Therapy 35  -MR        Patient Effort excellent  -MR        Symptoms Noted During/After Treatment increased pain;significant change in vital signs  -MR        Comment Pt HR elevated to 150 and OT desated to 87% with ~ 1 minute recovery with PLB to 92%. RN is aware.   -MR           General Information    Patient Profile Reviewed? yes  -MR        Onset of Illness/Injury or Date of Surgery 08/14/18  -MR        Referring Physician Luiz Durham MD  -MR        Patient Observations alert;cooperative;agree to therapy  -MR        Patient/Family Observations Wife present  -MR        General Observations of Patient IV, B SCD's, 5L O2 via NC, drain on right side  -MR        Prior Level of Function independent:;all household mobility;community mobility;transfer;ADL's;home management;cooking;cleaning;driving;shopping;using stairs;work  -MR        Equipment Currently Used at Home none   Has hip kit, TTB, rollator, raised toilet seat, hosptial bed  -MR        Pertinent History of Current Functional Problem Pt is s/p right THR posterior approach  -MR        Existing Precautions/Restrictions fall;hip, posterior;other (see comments)   please monitor vitals closely  -MR        Equipment Issued to Patient gait belt  -MR        Risks Reviewed patient:;spouse/S.O.:;LOB;nausea/vomiting;dizziness;increased discomfort;change in vital signs;increased drainage;lines disloged  -MR        Benefits Reviewed patient:;spouse/S.O.:;improve function;increase independence;increase strength;increase balance;decrease pain;decrease risk of DVT;improve skin integrity;increase knowledge  -MR        Barriers to Rehab none identified  -MR           Relationship/Environment    Lives With spouse;child(sumanth), dependent  -MR           Resource/Environmental Concerns    Current Living Arrangements home/apartment/condo  -MR         Resource/Environmental Concerns none  -MR           Cognitive Assessment/Interventions    Additional Documentation Cognitive Assessment/Intervention (Group)  -MR           Cognitive Assessment/Intervention- PT/OT    Affect/Mental Status (Cognitive) WNL;WFL  -MR        Orientation Status (Cognition) oriented x 4  -MR        Follows Commands (Cognition) WNL;WFL  -MR        Cognitive Function (Cognitive) WNL;WFL  -MR        Personal Safety Interventions fall prevention program maintained;gait belt;nonskid shoes/slippers when out of bed;supervised activity  -MR        Cognitive Assessment/Intervention Comment Educated pt on posterior hip precautions, he was able to repeat back and demonstrated good awareness with functional tasks with min VC  -MR           Safety Issues, Functional Mobility    Impairments Affecting Function (Mobility) pain  -MR           Mobility Assessment/Treatment    Extremity Weight-bearing Status right lower extremity  -MR        Right Lower Extremity (Weight-bearing Status) weight-bearing as tolerated (WBAT)  -MR           Bed Mobility Assessment/Treatment    Bed Mobility Assessment/Treatment supine-sit  -MR        Supine-Sit Branch (Bed Mobility) minimum assist (75% patient effort);verbal cues  -MR        Assistive Device (Bed Mobility) bed rails  -MR        Comment (Bed Mobility) VC for hip precautions  -MR           Functional Mobility    Functional Mobility- Ind. Level contact guard assist  -MR        Functional Mobility- Device rolling walker  -MR        Functional Mobility-Distance (Feet) --   in room  -MR           Transfer Assessment/Treatment    Transfer Assessment/Treatment sit-stand transfer;stand-sit transfer  -MR        Maintains Weight-bearing Status (Transfers) able to maintain  -MR           Sit-Stand Transfer    Sit-Stand Branch (Transfers) minimum assist (75% patient effort);verbal cues  -MR        Assistive Device (Sit-Stand Transfers) walker, front-wheeled  -MR            Stand-Sit Transfer    Stand-Sit Jenkins (Transfers) minimum assist (75% patient effort);verbal cues  -MR        Assistive Device (Stand-Sit Transfers) walker, front-wheeled  -MR           ADL Assessment/Intervention    BADL Assessment/Intervention lower body dressing  -MR           Lower Body Dressing Assessment/Training    Comment (Lower Body Dressing) OTR/L donned non-skid socks this date d/t no sock aid present. Pt reports he was using a sock aid prior to sx and is agreeable to using one at next OT session to ensure he is staying within his hip precautions  -MR           BADL Safety/Performance    Impairments, BADL Safety/Performance pain  -MR           General ROM    GENERAL ROM COMMENTS BUE AROM WFL  -MR           MMT (Manual Muscle Testing)    Additional Documentation General Assessment (Manual Muscle Testing) (Group)  -MR           General Assessment (Manual Muscle Testing)    General Manual Muscle Testing (MMT) Assessment no strength deficits identified   BUE 5/5  -MR           Sensory Assessment/Intervention    Sensory General Assessment no sensation deficits identified   BUE intact  -MR        Additional Documentation Vision Assessment/Intervention (Group)  -MR           Vision Assessment/Intervention    Visual Impairment/Limitations corrective lenses for reading  -MR           Positioning and Restraints    Pre-Treatment Position in bed  -MR        Post Treatment Position chair  -MR        In Chair notified nsg;reclined;call light within reach;encouraged to call for assist;with family/caregiver  -MR           Pain Assessment    Additional Documentation Pain Scale: Numbers Pre/Post-Treatment (Group)  -MR           Pain Scale: Numbers Pre/Post-Treatment    Pain Scale: Numbers, Pretreatment 4/10  -MR        Pain Scale: Numbers, Post-Treatment 4/10  -MR        Pain Location - Side Right  -MR        Pain Location hip  -MR        Pain Intervention(s) Medication (See  MAR);Repositioned;Ambulation/increased activity;Distraction  -MR           Wound 08/14/18 1028 Right hip surgical    Wound - Properties Group Date first assessed: 08/14/18  -KW Time first assessed: 1028  -KW Present On Admission : no  -KW Side: Right  -KW Location: hip  -KW Type: surgical  -KW       Plan of Care Review    Plan of Care Reviewed With patient;spouse  -MR           Clinical Impression (OT)    Date of Referral to OT 08/14/18  -MR        OT Diagnosis impaired mobility and ADLs  -MR        Prognosis (OT Eval) good  -MR        Functional Level at Time of Evaluation (OT Eval) impaired mobility and ADLs  -MR        Patient/Family Goals Statement (OT Eval) Return home  -MR        Criteria for Skilled Therapeutic Interventions Met (OT Eval) yes;treatment indicated  -MR        Rehab Potential (OT Eval) good, to achieve stated therapy goals  -MR        Therapy Frequency (OT Eval) --   7 days/wk  -MR        Predicted Duration of Therapy Intervention (Therapy Eval) until d/c or all goals met  -MR        Care Plan Review (OT) evaluation/treatment results reviewed;care plan/treatment goals reviewed;risks/benefits reviewed;current/potential barriers reviewed;patient/other agree to care plan  -MR        Care Plan Review, Other Participant (OT Eval) spouse  -MR        Anticipated Discharge Disposition (OT) home with assist;home with OP services  -MR           Vital Signs    Pre Systolic BP Rehab 149  -MR        Pre Treatment Diastolic BP 89  -MR        Post Systolic BP Rehab 121  -MR        Post Treatment Diastolic BP 97  -MR        Pretreatment Heart Rate (beats/min) 121  -MR        Intratreatment Heart Rate (beats/min) 150  -MR        Posttreatment Heart Rate (beats/min) 145  -MR        Pre SpO2 (%) 88  -MR        O2 Delivery Pre Treatment supplemental O2  -MR        Intra SpO2 (%) 94  -MR        O2 Delivery Intra Treatment supplemental O2  -MR        Post SpO2 (%) 95  -MR        O2 Delivery Post Treatment  supplemental O2  -MR        Pre Patient Position Supine  -MR        Intra Patient Position Standing  -MR        Post Patient Position Sitting  -MR        Recovery Time RN aware of elevated HR  -MR           Planned OT Interventions    Planned Therapy Interventions (OT Eval) activity tolerance training;adaptive equipment training;BADL retraining;functional balance retraining;IADL retraining;occupation/activity based interventions;patient/caregiver education/training;ROM/therapeutic exercise;strengthening exercise;transfer/mobility retraining  -MR        Transfer/Mobility Retraining BSC was placed over toilet  -MR           OT Goals    Transfer Goal Selection (OT) transfer, OT goal 1  -MR        Bathing Goal Selection (OT) bathing, OT goal 1  -MR        Dressing Goal Selection (OT) dressing, OT goal 1  -MR        Toileting Goal Selection (OT) toileting, OT goal 1  -MR           Transfer Goal 1 (OT)    Activity/Assistive Device (Transfer Goal 1, OT) transfers, all;walker, rolling  -MR        [place] Level/Cues Needed (Transfer Goal 1, OT) supervision required  -MR        Time Frame (Transfer Goal 1, OT) long term goal (LTG);by discharge  -MR        Progress/Outcome (Transfer Goal 1, OT) goal not met  -MR           Bathing Goal 1 (OT)    Activity/Assistive Device (Bathing Goal 1, OT) bathing skills, all;long-handled sponge;reacher;shower chair  -MR        [place] Level/Cues Needed (Bathing Goal 1, OT) supervision required;set-up required  -MR        Time Frame (Bathing Goal 1, OT) long term goal (LTG);by discharge  -MR        Progress/Outcomes (Bathing Goal 1, OT) goal not met  -MR           Dressing Goal 1 (OT)    Activity/Assistive Device (Dressing Goal 1, OT) dressing skills, all;long handled shoe horn;reacher;sock-aid  -MR        [place]/Cues Needed (Dressing Goal 1, OT) set-up required;supervision required  -MR        Time Frame (Dressing Goal 1, OT) long term goal (LTG);by discharge  -MR         Progress/Outcome (Dressing Goal 1, OT) goal not met  -MR           Toileting Goal 1 (OT)    Activity/Device (Toileting Goal 1, OT) toileting skills, all;commode, bedside with drop arms;commode, bedside without drop arms;commode, 3-in-1;raised toilet seat  -MR        [place] Level/Cues Needed (Toileting Goal 1, OT) supervision required  -MR        Time Frame (Toileting Goal 1, OT) long term goal (LTG);by discharge  -MR        Progress/Outcome (Toileting Goal 1, OT) goal not met  -MR           Living Environment    Home Accessibility tub/shower is not walk in  -MR          User Key  (r) = Recorded By, (t) = Taken By, (c) = Cosigned By    Initials Name Effective Dates    KW Denise Mcmanus RN 10/17/16 -     MR Beth Jackman, OT 04/03/18 -            Occupational Therapy Education     Title: PT OT SLP Therapies (Active)     Topic: Occupational Therapy (Active)     Point: ADL training (Done)     Description: Instruct learner(s) on proper safety adaptation and remediation techniques during self care or transfers.   Instruct in proper use of assistive devices.   Learning Progress Summary     Learner Status Readiness Method Response Comment Documented by    Patient Done Acceptance E,TB VU Role of OT and POC. Hip precautions, AD/AE use for ADLs, and safety with transfers, functional mobility MR 08/14/18 1518    Significant Other Done Acceptance E,TB VU Role of OT and POC. Hip precautions, AD/AE use for ADLs, and safety with transfers, functional mobility MR 08/14/18 1518          Point: Precautions (Done)     Description: Instruct learner(s) on prescribed precautions during self-care and functional transfers.   Learning Progress Summary     Learner Status Readiness Method Response Comment Documented by    Patient Done Acceptance E,TB VU Role of OT and POC. Hip precautions, AD/AE use for ADLs, and safety with transfers, functional mobility MR 08/14/18 1518    Significant Other Done Acceptance E,TB VU Role of OT and  POC. Hip precautions, AD/AE use for ADLs, and safety with transfers, functional mobility MR 08/14/18 1518          Point: Body mechanics (Done)     Description: Instruct learner(s) on proper positioning and spine alignment during self-care, functional mobility activities and/or exercises.   Learning Progress Summary     Learner Status Readiness Method Response Comment Documented by    Patient Done Acceptance E,TB VU Role of OT and POC. Hip precautions, AD/AE use for ADLs, and safety with transfers, functional mobility MR 08/14/18 1518    Significant Other Done Acceptance E,TB VU Role of OT and POC. Hip precautions, AD/AE use for ADLs, and safety with transfers, functional mobility MR 08/14/18 1518                      User Key     Initials Effective Dates Name Provider Type Discipline    MR 04/03/18 -  Beth Jackman OT Occupational Therapist OT              Non-skid socks and gait belt in place. Toileting offered. Call light and needs within reach. Pt advised to not get up alone and call the nurse for assistance. Pt sitting up in chair to eat lunch, RN is aware and spouse is present.       OT Recommendation and Plan  Outcome Summary/Treatment Plan (OT)  Anticipated Discharge Disposition (OT): home with assist, home with OP services  Planned Therapy Interventions (OT Eval): activity tolerance training, adaptive equipment training, BADL retraining, functional balance retraining, IADL retraining, occupation/activity based interventions, patient/caregiver education/training, ROM/therapeutic exercise, strengthening exercise, transfer/mobility retraining  Therapy Frequency (OT Eval):  (7 days/wk)  Plan of Care Review  Plan of Care Reviewed With: patient, spouse  Plan of Care Reviewed With: patient, spouse  Outcome Summary: OT evaluation completed today. Pt is a 48 y/o M s/p right THR posterior approach. Pt completed bed mobility with min A, sit <> stand t/f with min A with FWW, min distance functional ambulation  with CGA with FWW. Pt required min VC to adhere to hip precautions. Pt could benefit from skilled OT services to address decreased strength, activity tolerance, transfers, functional mobility, AD/AE utilization and independence with ADLs. Recommend pt d/c home with assist and OP PT services.           Outcome Measures     Row Name 08/14/18 1337 08/14/18 1336          How much help from another person do you currently need...    Turning from your back to your side while in flat bed without using bedrails?  -- 3  -KW     Moving from lying on back to sitting on the side of a flat bed without bedrails?  -- 3  -KW     Moving to and from a bed to a chair (including a wheelchair)?  -- 3  -KW     Standing up from a chair using your arms (e.g., wheelchair, bedside chair)?  -- 3  -KW     Climbing 3-5 steps with a railing?  -- 2  -KW     To walk in hospital room?  -- 3  -KW     AM-PAC 6 Clicks Score  -- 17  -KW        How much help from another is currently needed...    Putting on and taking off regular lower body clothing? 2  -MR  --     Bathing (including washing, rinsing, and drying) 2  -MR  --     Toileting (which includes using toilet bed pan or urinal) 2  -MR  --     Putting on and taking off regular upper body clothing 4  -MR  --     Taking care of personal grooming (such as brushing teeth) 4  -MR  --     Eating meals 4  -MR  --     Score 18  -MR  --        Functional Assessment    Outcome Measure Options AM-PAC 6 Clicks Daily Activity (OT)  - AM-PAC 6 Clicks Basic Mobility (PT)  -       User Key  (r) = Recorded By, (t) = Taken By, (c) = Cosigned By    Initials Name Provider Type    MR Jackman Beth LAURA, OT Occupational Therapist    KW Pricilla Payne, PT Physical Therapist          Time Calculation:   OT Start Time: 1336  OT Stop Time: 1411  OT Time Calculation (min): 35 min  Therapy Suggested Charges     Code   Minutes Charges    None           Therapy Charges for Today     Code Description Service Date Service  Provider Modifiers Qty    21170318978 HC OT SELFCARE CURRENT 8/14/2018 Beth Jackman OT GO CK 1    16523349720 HC OT SELFCARE PROJECTED 8/14/2018 Beth Jackman OT GO CJ 1    09731120143 HC OT EVAL MOD COMPLEXITY 2 8/14/2018 Beth Jackman OT GO 1          OT G-codes  OT Professional Judgement Used?: Yes  OT Functional Scales Options: AM-PAC 6 Clicks Daily Activity (OT)  Score: 18  Functional Limitation: Self care  Self Care Current Status (): At least 40 percent but less than 60 percent impaired, limited or restricted  Self Care Goal Status (): At least 20 percent but less than 40 percent impaired, limited or restricted    Beth Jackman OT  8/14/2018

## 2018-08-14 NOTE — PLAN OF CARE
Problem: Patient Care Overview  Goal: Plan of Care Review  Outcome: Ongoing (interventions implemented as appropriate)   08/14/18 1424   Coping/Psychosocial   Plan of Care Reviewed With patient   Plan of Care Review   Progress no change   OTHER   Outcome Summary pt is a new admission; working on pain control     Goal: Individualization and Mutuality  Outcome: Ongoing (interventions implemented as appropriate)    Goal: Discharge Needs Assessment  Outcome: Ongoing (interventions implemented as appropriate)    Goal: Interprofessional Rounds/Family Conf  Outcome: Ongoing (interventions implemented as appropriate)      Problem: Fall Risk (Adult)  Goal: Identify Related Risk Factors and Signs and Symptoms  Outcome: Outcome(s) achieved Date Met: 08/14/18    Goal: Absence of Fall  Outcome: Ongoing (interventions implemented as appropriate)      Problem: Hip Arthroplasty (Total, Partial) (Adult)  Goal: Signs and Symptoms of Listed Potential Problems Will be Absent, Minimized or Managed (Hip Arthroplasty)  Outcome: Ongoing (interventions implemented as appropriate)    Goal: Anesthesia/Sedation Recovery  Outcome: Ongoing (interventions implemented as appropriate)

## 2018-08-15 VITALS
DIASTOLIC BLOOD PRESSURE: 72 MMHG | SYSTOLIC BLOOD PRESSURE: 136 MMHG | HEART RATE: 80 BPM | TEMPERATURE: 96.9 F | WEIGHT: 271.17 LBS | BODY MASS INDEX: 41.1 KG/M2 | RESPIRATION RATE: 18 BRPM | OXYGEN SATURATION: 93 % | HEIGHT: 68 IN

## 2018-08-15 LAB
ANION GAP SERPL CALCULATED.3IONS-SCNC: 9 MMOL/L (ref 5–15)
BUN BLD-MCNC: 13 MG/DL (ref 7–21)
BUN/CREAT SERPL: 20 (ref 7–25)
CALCIUM SPEC-SCNC: 8.1 MG/DL (ref 8.4–10.2)
CHLORIDE SERPL-SCNC: 102 MMOL/L (ref 95–110)
CO2 SERPL-SCNC: 25 MMOL/L (ref 22–31)
CREAT BLD-MCNC: 0.65 MG/DL (ref 0.7–1.3)
DEPRECATED RDW RBC AUTO: 45.2 FL (ref 35.1–43.9)
ERYTHROCYTE [DISTWIDTH] IN BLOOD BY AUTOMATED COUNT: 14.2 % (ref 11.5–14.5)
GFR SERPL CREATININE-BSD FRML MDRD: 132 ML/MIN/1.73 (ref 63–147)
GLUCOSE BLD-MCNC: 124 MG/DL (ref 60–100)
HCT VFR BLD AUTO: 36.4 % (ref 39–49)
HGB BLD-MCNC: 12.2 G/DL (ref 13.7–17.3)
LAB AP CASE REPORT: NORMAL
MCH RBC QN AUTO: 29.2 PG (ref 26.5–34)
MCHC RBC AUTO-ENTMCNC: 33.5 G/DL (ref 31.5–36.3)
MCV RBC AUTO: 87.1 FL (ref 80–98)
PATH REPORT.FINAL DX SPEC: NORMAL
PATH REPORT.GROSS SPEC: NORMAL
PLATELET # BLD AUTO: 235 10*3/MM3 (ref 150–450)
PMV BLD AUTO: 9.5 FL (ref 8–12)
POTASSIUM BLD-SCNC: 4.1 MMOL/L (ref 3.5–5.1)
RBC # BLD AUTO: 4.18 10*6/MM3 (ref 4.37–5.74)
SODIUM BLD-SCNC: 136 MMOL/L (ref 137–145)
WBC NRBC COR # BLD: 8.83 10*3/MM3 (ref 3.2–9.8)

## 2018-08-15 PROCEDURE — 85027 COMPLETE CBC AUTOMATED: CPT | Performed by: ORTHOPAEDIC SURGERY

## 2018-08-15 PROCEDURE — 97530 THERAPEUTIC ACTIVITIES: CPT

## 2018-08-15 PROCEDURE — 97116 GAIT TRAINING THERAPY: CPT

## 2018-08-15 PROCEDURE — 97110 THERAPEUTIC EXERCISES: CPT

## 2018-08-15 PROCEDURE — 80048 BASIC METABOLIC PNL TOTAL CA: CPT | Performed by: ORTHOPAEDIC SURGERY

## 2018-08-15 PROCEDURE — 99024 POSTOP FOLLOW-UP VISIT: CPT | Performed by: ORTHOPAEDIC SURGERY

## 2018-08-15 PROCEDURE — 97535 SELF CARE MNGMENT TRAINING: CPT

## 2018-08-15 RX ORDER — OXYCODONE HYDROCHLORIDE 5 MG/1
5 TABLET ORAL EVERY 4 HOURS PRN
Qty: 30 TABLET | Refills: 0 | Status: SHIPPED | OUTPATIENT
Start: 2018-08-15 | End: 2018-08-24

## 2018-08-15 RX ORDER — OXYCODONE HCL 10 MG/1
10 TABLET, FILM COATED, EXTENDED RELEASE ORAL EVERY 12 HOURS SCHEDULED
Qty: 8 TABLET | Refills: 0 | Status: SHIPPED | OUTPATIENT
Start: 2018-08-15 | End: 2018-08-19

## 2018-08-15 RX ADMIN — OXYCODONE HYDROCHLORIDE 5 MG: 5 TABLET ORAL at 15:44

## 2018-08-15 RX ADMIN — FAMOTIDINE 40 MG: 40 TABLET ORAL at 08:10

## 2018-08-15 RX ADMIN — OXYCODONE HYDROCHLORIDE 10 MG: 10 TABLET, FILM COATED, EXTENDED RELEASE ORAL at 08:09

## 2018-08-15 RX ADMIN — ACETAMINOPHEN 1000 MG: 500 TABLET ORAL at 11:00

## 2018-08-15 RX ADMIN — ACETAMINOPHEN 1000 MG: 500 TABLET ORAL at 08:09

## 2018-08-15 RX ADMIN — ATORVASTATIN CALCIUM 40 MG: 40 TABLET, FILM COATED ORAL at 08:09

## 2018-08-15 RX ADMIN — FERROUS SULFATE TAB EC 324 MG (65 MG FE EQUIVALENT) 324 MG: 324 (65 FE) TABLET DELAYED RESPONSE at 08:10

## 2018-08-15 RX ADMIN — REGULAR STRENGTH 325 MG: 325 TABLET ORAL at 08:10

## 2018-08-15 RX ADMIN — CLINDAMYCIN IN 5 PERCENT DEXTROSE 900 MG: 18 INJECTION, SOLUTION INTRAVENOUS at 00:01

## 2018-08-15 NOTE — THERAPY TREATMENT NOTE
Acute Care - Occupational Therapy Treatment Note  [facility]     Patient Name: Jamey Berry  : 1970  MRN: [id number]  Today's Date: 8/15/2018  Onset of Illness/Injury or Date of Surgery: 18  Date of Referral to OT: 18  Referring Physician: Luiz Durham MD    Admit Date: 2018       ICD-10-CM ICD-9-CM   1. Impaired functional mobility, balance, gait, and endurance Z74.09 V49.89   2. Post-traumatic osteoarthritis of right hip M16.51 715.25   3. Impaired mobility and activities of daily living Z74.09 799.89     Patient Active Problem List   Diagnosis   • Panic disorder   • Morbid obesity (CMS/HCC)   • Generalized anxiety disorder   • Elevated blood pressure reading without diagnosis of hypertension   • Depressive disorder   • Pain in lower limb   • Right hip pain   • Closed displaced fracture of medial malleolus of right tibia   • Post-traumatic osteoarthritis of right hip   • Closed left subtrochanteric femur fracture, with routine healing, subsequent encounter     Past Medical History:   Diagnosis Date   • Arthritis    • Chest pain    • Depressive disorder    • Dyspnea    • Elevated blood pressure reading without diagnosis of hypertension    • Generalized anxiety disorder    • Hip pain     ortho recommended replacement   • Hyperlipidemia    • Keloid scar     r/o cancer   • Morbid obesity (CMS/HCC)    • Need for immunization against influenza    • Pain in lower limb     right post fx/phong   • Panic disorder    • Skin cancer     basal cell removed from scalp   • Sleep apnea      Past Surgical History:   Procedure Laterality Date   • ORIF FEMUR FRACTURE      hip and ankle done at same time secondary to trauma       Therapy Treatment          Rehabilitation Treatment Summary     Row Name 08/15/18 1028 08/15/18 0846          Treatment Time/Intention    Discipline physical therapy assistant  -TW occupational therapy assistant  -CS     Document Type therapy note (daily note)   -TW therapy note (daily note)  -CS     Subjective Information no complaints  -TW no complaints  -CS     Mode of Treatment physical therapy;individual therapy  -TW occupational therapy  -CS     Therapy Frequency (OT Eval) --   7 days/wk  -TW  --     Patient Effort excellent  -TW excellent  -CS2     Existing Precautions/Restrictions fall;hip, posterior;other (see comments)   please monitor vitals closely  -TW fall;hip, posterior;other (see comments)  -CS3     Equipment Issued to Patient gait belt  -TW  --     Recorded by [TW] Jose Dillon PTA 08/15/18 1125 [CS] Vani Wing, JEAN/L 08/15/18 0857  [CS2] Vani Wing, JEAN/L 08/15/18 0938  [CS3] Vani Wing, JEAN/L 08/15/18 1152     Row Name 08/15/18 1028 08/15/18 0846          Vital Signs    Pre Systolic BP Rehab 113  -  -CS     Pre Treatment Diastolic BP 63  -TW 69  -CS     Post Systolic BP Rehab 134  -TW  --     Post Treatment Diastolic BP 71  -TW  --     Pretreatment Heart Rate (beats/min) 104  -TW 95  -CS     Posttreatment Heart Rate (beats/min) 98  -  -CS2     Pre SpO2 (%) 92  -TW 92  -CS     O2 Delivery Pre Treatment room air  -TW room air  -CS     Post SpO2 (%) 95  -TW 93  -CS2     O2 Delivery Post Treatment  -- room air  -CS2     Pre Patient Position Supine  -TW Supine  -CS     Intra Patient Position Standing  -TW Standing  -CS2     Post Patient Position Sitting  -TW Supine  -CS2     Recorded by [TW] Jose Dillon, ELA 08/15/18 1125 [CS] Vani Wing, JEAN/L 08/15/18 0857  [CS2] Vani Wing, JEAN/L 08/15/18 0938     Row Name 08/15/18 1028 08/15/18 0846          Cognitive Assessment/Intervention- PT/OT    Affect/Mental Status (Cognitive) WNL;WFL  -TW WFL  -CS     Orientation Status (Cognition) oriented x 4  -TW oriented x 4  -CS     Follows Commands (Cognition) WNL;WFL  -TW WNL;WFL  -CS     Cognitive Function (Cognitive) WNL;WFL  -TW  --     Personal Safety Interventions fall prevention program maintained;gait  belt;nonskid shoes/slippers when out of bed  -TW  --     Cognitive Assessment/Intervention Comment Pt was able to recall hip precautions from yesterday with very little cuing.  -TW  --     Recorded by [TW] Jose Dillon, ELA 08/15/18 1125 [CS] Vani Wing COTA/L 08/15/18 0938     Row Name 08/15/18 1028             Safety Issues, Functional Mobility    Impairments Affecting Function (Mobility) pain  -TW      Recorded by [TW] Jose Dillon PTA 08/15/18 1125      Row Name 08/15/18 1028             Mobility Assessment/Intervention    Extremity Weight-bearing Status right lower extremity  -TW      Right Lower Extremity (Weight-bearing Status) weight-bearing as tolerated (WBAT)  -TW      Recorded by [TW] Jose Dillon PTA 08/15/18 1125      Row Name 08/15/18 1028 08/15/18 0846          Bed Mobility Assessment/Treatment    Bed Mobility Assessment/Treatment supine-sit  -TW supine-sit  -CS     Supine-Sit [place] (Bed Mobility) supervision  -TW supervision  -CS     Assistive Device (Bed Mobility) bed rails  -TW bed rails;overhead trapeze  -CS     Recorded by [TW] Jose Dillon PTA 08/15/18 1125 [CS] Vani Wing COTA/L 08/15/18 1152     Row Name 08/15/18 1028 08/15/18 0846          Functional Mobility    Functional Mobility- Ind. Level contact guard assist  -TW contact guard assist  -CS     Functional Mobility- Device rolling walker  -TW rolling walker  -CS     Functional Mobility-Distance (Feet)  -- 15  -CS     Recorded by [TW] Jose Dillon, PTA 08/15/18 1125 [CS] Vani Wing JEAN/L 08/15/18 1152     Row Name 08/15/18 1028 08/15/18 0846          Transfer Assessment/Treatment    Transfer Assessment/Treatment sit-stand transfer;stand-sit transfer  -TW sit-stand transfer;stand-sit transfer;toilet transfer  -CS     Recorded by [TW] Jose Dillon, PTA 08/15/18 1125 [CS] Vani Wing JEAN/L 08/15/18 1152     Row Name 08/15/18 1028 08/15/18 0846           Sit-Stand Transfer    Sit-Stand [place] (Transfers) verbal cues;stand by assist  -TW supervision  -CS     Assistive Device (Sit-Stand Transfers) walker, front-wheeled  -TW walker, front-wheeled  -CS     Recorded by [TW] Jose Dillon, PTA 08/15/18 1125 [CS] Vani Wing COTA/L 08/15/18 1152     Row Name 08/15/18 1028 08/15/18 0846          Stand-Sit Transfer    Stand-Sit [place] (Transfers) verbal cues;stand by assist  -TW supervision  -CS     Assistive Device (Stand-Sit Transfers) walker, front-wheeled  -TW walker, front-wheeled  -CS     Recorded by [TW] Jose Dillon PTA 08/15/18 1125 [CS] Vani Wing COTA/L 08/15/18 1152     Row Name 08/15/18 0846             Toilet Transfer    Type (Toilet Transfer) sit-stand;stand-sit  -CS      [place] Level (Toilet Transfer) contact guard;supervision  -CS      Assistive Device (Toilet Transfer) grab bars/safety frame;raised toilet seat  -CS      Recorded by [CS] Vani iWng COTA/L 08/15/18 1152      Row Name 08/15/18 1028             Gait/Stairs Assessment/Training    Gait/Stairs Assessment/Training gait/ambulation assistive device  -TW      [place] Level (Gait) stand by assist  -TW      Assistive Device (Gait) walker, front-wheeled  -TW      Distance in Feet (Gait) 108  -TW      Deviations/Abnormal Patterns (Gait) gait speed decreased  -TW      Recorded by [TW] Jose Dillon, ELA 08/15/18 1125      Row Name 08/15/18 0846             ADL Assessment/Intervention    BADL Assessment/Intervention bathing;grooming;toileting  -CS      Recorded by [CS] Vani Wing COTA/L 08/15/18 1152      Row Name 08/15/18 0846             Bathing Assessment/Intervention    Comment (Bathing) Pt deferred to get a bath this date.  -CS      Recorded by [CS] Vani Wing COTA/L 08/15/18 1152      Row Name 08/15/18 0846             Grooming Assessment/Training    [place] Level (Grooming) grooming skills;oral care regimen;wash  face, hands;supervision  -CS      Grooming Position sink side;supported standing  -CS      Recorded by [CS] Vani Wing COTA/L 08/15/18 1152      Row Name 08/15/18 0846             Toileting Assessment/Training    Standard Level (Toileting) toileting skills;supervision;contact guard assist  -CS      Assistive Devices (Toileting) raised toilet seat;grab bar/safety frame  -CS      Toileting Position supported standing  -CS      Recorded by [CS] Vani Wing COTA/L 08/15/18 1152      Row Name 08/15/18 1028             General Assessment (Manual Muscle Testing)    General Manual Muscle Testing (MMT) Assessment no strength deficits identified   BUE 5/5  -TW      Recorded by [TW] Jose Dillon PTA 08/15/18 1125      Row Name 08/15/18 0846             Therapeutic Exercise    Upper Extremity (Therapeutic Exercise) bicep curl, bilateral  -CS      Upper Extremity Range of Motion (Therapeutic Exercise) shoulder flexion/extension, bilateral;shoulder internal/external rotation, bilateral;elbow flexion/extension, bilateral;forearm supination/pronation, bilateral;wrist flexion/extension, bilateral  -CS      Hand (Therapeutic Exercise) finger flexion/extension, bilateral  -CS      Weight/Resistance (Therapeutic Exercise) 3 pounds  -CS      Exercise Type (Therapeutic Exercise) AROM (active range of motion)  -CS      Position (Therapeutic Exercise) seated  -CS      Sets/Reps (Therapeutic Exercise) 1/20  -CS      Equipment (Therapeutic Exercise) free weight, barbell  -CS      Expected Outcome (Therapeutic Exercise) improve functional tolerance, self-care activity;improve performance, transfer skills  -CS      Recorded by [CS] Vani Wing COTA/L 08/15/18 1152      Row Name 08/15/18 1028 08/15/18 0846          Positioning and Restraints    Pre-Treatment Position in bed  -TW in bed  -CS     Post Treatment Position chair  -TW bed  -CS2     In Bed  -- sitting EOB;call light within reach;encouraged to call for  assist  -CS3     In Chair reclined;call light within reach;encouraged to call for assist  -TW  --     Recorded by [TW] Jose Dillon, ELA 08/15/18 1125 [CS] Vani Wing, JEAN/L 08/15/18 0857  [CS2] Vani Wing, JEAN/L 08/15/18 0938  [CS3] Vani Wing, JEAN/L 08/15/18 1152     Row Name 08/15/18 1028 08/15/18 0846          Pain Scale: Numbers Pre/Post-Treatment    Pain Scale: Numbers, Pretreatment 0/10 - no pain  -TW 2/10  -CS     Pain Scale: Numbers, Post-Treatment 1/10  -TW 2/10  -CS2     Pain Location - Side Right  -TW Right  -CS     Pain Location - Orientation  -- generalized  -CS     Pain Location hip  -TW hip  -CS     Recorded by [TW] Jose Dillon, ELA 08/15/18 1125 [CS] Vani Wing, JEAN/L 08/15/18 0857  [CS2] Vani Wing, JEAN/L 08/15/18 1152     Row Name 08/15/18 1028             Sensory Assessment/Intervention    Sensory General Assessment no sensation deficits identified   BUE intact  -TW      Recorded by [TW] Jose Dillon PTA 08/15/18 1125      Row Name 08/15/18 1028             Vision Assessment/Intervention    Visual Impairment/Limitations corrective lenses for reading  -TW      Recorded by [TW] Jose Dillon PTA 08/15/18 1125      Row Name                Wound 08/14/18 1028 Right hip surgical    Wound - Properties Group Date first assessed: 08/14/18 [KW] Time first assessed: 1028 [KW] Present On Admission : no [KW] Side: Right [KW] Location: hip [KW] Type: surgical [KW] Recorded by:  [KW] Denise Mcmanus RN 08/14/18 1028    Row Name 08/15/18 1028 08/15/18 0846          Outcome Summary/Treatment Plan (OT)    Daily Summary of Progress (OT)  -- progress toward functional goals as expected  -CS     Plan for Continued Treatment (OT)  -- cont OT POC  -CS     Anticipated Discharge Disposition (OT) home with assist;home with OP services  -TW home with assist;home with OP services  -CS     Recorded by [TW] Jose Dillon, PTA 08/15/18 1125 [CS]  Maciej Vani ANDERSEN, JEAN/L 08/15/18 1152     Row Name 08/15/18 1028             Outcome Summary/Treatment Plan (PT)    Daily Summary of Progress (PT) progress toward functional goals as expected  -TW      Barriers to Overall Progress (PT) Pain  -TW      Plan for Continued Treatment (PT) Cont with POC  -TW      Anticipated Discharge Disposition (PT) home with OP services  -TW      Recorded by [TW] Jose Dillon, PTA 08/15/18 1125        User Key  (r) = Recorded By, (t) = Taken By, (c) = Cosigned By    Initials Name Effective Dates Discipline    TW Jose Dillon, PTA 03/07/18 -  PT    CS Vani Wing, JEAN/L 03/07/18 -  OT    KW Denise Mcmanus RN 10/17/16 -  Nurse        Wound 08/14/18 1028 Right hip surgical (Active)   Dressing Appearance intact;dry;no drainage 8/15/2018  8:09 AM   Wound Output (mL) 100 8/15/2018  5:00 AM             OT Rehab Goals     Row Name 08/15/18 0846             Transfer Goal 1 (OT)    Activity/Assistive Device (Transfer Goal 1, OT) transfers, all;walker, rolling  -CS      [place] Level/Cues Needed (Transfer Goal 1, OT) supervision required  -CS      Time Frame (Transfer Goal 1, OT) long term goal (LTG);by discharge  -CS      Progress/Outcome (Transfer Goal 1, OT) goal not met  -CS         Bathing Goal 1 (OT)    Activity/Assistive Device (Bathing Goal 1, OT) bathing skills, all;long-handled sponge;reacher;shower chair  -CS      [place] Level/Cues Needed (Bathing Goal 1, OT) supervision required;set-up required  -CS      Time Frame (Bathing Goal 1, OT) long term goal (LTG);by discharge  -CS      Progress/Outcomes (Bathing Goal 1, OT) goal not met  -CS         Dressing Goal 1 (OT)    Activity/Assistive Device (Dressing Goal 1, OT) dressing skills, all;long handled shoe horn;reacher;sock-aid  -CS      [place]/Cues Needed (Dressing Goal 1, OT) set-up required;supervision required  -CS      Time Frame (Dressing Goal 1, OT) long term goal (LTG);by discharge  -CS       Progress/Outcome (Dressing Goal 1, OT) goal not met  -CS         Toileting Goal 1 (OT)    Activity/Device (Toileting Goal 1, OT) toileting skills, all;commode, bedside with drop arms;commode, bedside without drop arms;commode, 3-in-1;raised toilet seat  -CS      [place] Level/Cues Needed (Toileting Goal 1, OT) supervision required  -CS      Time Frame (Toileting Goal 1, OT) long term goal (LTG);by discharge  -CS      Progress/Outcome (Toileting Goal 1, OT) goal not met  -CS        User Key  (r) = Recorded By, (t) = Taken By, (c) = Cosigned By    Initials Name Provider Type Discipline    CS Vani Wing COTA/L Occupational Therapy Assistant OT        Occupational Therapy Education     Title: PT OT SLP Therapies (Active)     Topic: Occupational Therapy (Done)     Point: ADL training (Done)     Description: Instruct learner(s) on proper safety adaptation and remediation techniques during self care or transfers.   Instruct in proper use of assistive devices.   Learning Progress Summary     Learner Status Readiness Method Response Comment Documented by    Patient Done Acceptance E,TB,D,H VU Pt was educated on home safety and HEP.  08/15/18 1154     Done Acceptance E,TB VU Role of OT and POC. Hip precautions, AD/AE use for ADLs, and safety with transfers, functional mobility MR 08/14/18 1518    Significant Other Done Acceptance E,TB VU Role of OT and POC. Hip precautions, AD/AE use for ADLs, and safety with transfers, functional mobility MR 08/14/18 1518          Point: Home exercise program (Done)     Description: Instruct learner(s) on appropriate technique for monitoring, assisting and/or progressing therapeutic exercises/activities.   Learning Progress Summary     Learner Status Readiness Method Response Comment Documented by    Patient Done Acceptance E,TB,D,H VU Pt was educated on home safety and HEP.  08/15/18 1154          Point: Precautions (Done)     Description: Instruct learner(s) on  prescribed precautions during self-care and functional transfers.   Learning Progress Summary     Learner Status Readiness Method Response Comment Documented by    Patient Done Acceptance E,TB,D,H VU Pt was educated on home safety and HEP.  08/15/18 1154     Done Acceptance E,TB VU Role of OT and POC. Hip precautions, AD/AE use for ADLs, and safety with transfers, functional mobility MR 08/14/18 1518    Significant Other Done Acceptance E,TB VU Role of OT and POC. Hip precautions, AD/AE use for ADLs, and safety with transfers, functional mobility MR 08/14/18 1518          Point: Body mechanics (Done)     Description: Instruct learner(s) on proper positioning and spine alignment during self-care, functional mobility activities and/or exercises.   Learning Progress Summary     Learner Status Readiness Method Response Comment Documented by    Patient Done Acceptance E,TB,D,H VU Pt was educated on home safety and HEP.  08/15/18 1154     Done Acceptance E,TB VU Role of OT and POC. Hip precautions, AD/AE use for ADLs, and safety with transfers, functional mobility MR 08/14/18 1518    Significant Other Done Acceptance E,TB VU Role of OT and POC. Hip precautions, AD/AE use for ADLs, and safety with transfers, functional mobility MR 08/14/18 1518                      User Key     Initials Effective Dates Name Provider Type Discipline     03/07/18 -  Vani Wing COTA/GANESH Occupational Therapy Assistant OT     04/03/18 -  Beth Jackman OT Occupational Therapist OT                OT Recommendation and Plan  Outcome Summary/Treatment Plan (OT)  Daily Summary of Progress (OT): progress toward functional goals as expected  Plan for Continued Treatment (OT): cont OT POC  Anticipated Discharge Disposition (OT): home with assist, home with OP services  Daily Summary of Progress (OT): progress toward functional goals as expected  Plan of Care Review  Plan of Care Reviewed With: patient  Plan of Care Reviewed With:  patient  Outcome Summary: Pt tolerated tx well this date. Pt was SBA with bed mobility. Pt was CGA/SBA with toilet t/f. Pt was SBA with grooming task at sink. Continue OT POC.        Outcome Measures     Row Name 08/15/18 1100 08/15/18 1028 08/14/18 1337       How much help from another person do you currently need...    Turning from your back to your side while in flat bed without using bedrails?  -- 4  -TW  --    Moving from lying on back to sitting on the side of a flat bed without bedrails?  -- 4  -TW  --    Moving to and from a bed to a chair (including a wheelchair)?  -- 4  -TW  --    Standing up from a chair using your arms (e.g., wheelchair, bedside chair)?  -- 4  -TW  --    Climbing 3-5 steps with a railing?  -- 3  -TW  --    To walk in hospital room?  -- 4  -TW  --    AM-PAC 6 Clicks Score  -- 23  -TW  --       How much help from another is currently needed...    Putting on and taking off regular lower body clothing? 2  -CS  -- 2  -MR    Bathing (including washing, rinsing, and drying) 2  -CS  -- 2  -MR    Toileting (which includes using toilet bed pan or urinal) 2  -CS  -- 2  -MR    Putting on and taking off regular upper body clothing 4  -CS  -- 4  -MR    Taking care of personal grooming (such as brushing teeth) 4  -CS  -- 4  -MR    Eating meals 4  -CS  -- 4  -MR    Score 18  -CS  -- 18  -MR       Functional Assessment    Outcome Measure Options AM-PAC 6 Clicks Daily Activity (OT)  -CS AM-PAC 6 Clicks Basic Mobility (PT)  -TW AM-PAC 6 Clicks Daily Activity (OT)  -MR    Row Name 08/14/18 1336             How much help from another person do you currently need...    Turning from your back to your side while in flat bed without using bedrails? 3  -KW      Moving from lying on back to sitting on the side of a flat bed without bedrails? 3  -KW      Moving to and from a bed to a chair (including a wheelchair)? 3  -KW      Standing up from a chair using your arms (e.g., wheelchair, bedside chair)? 3  -KW       Climbing 3-5 steps with a railing? 2  -KW      To walk in hospital room? 3  -KW      AM-PAC 6 Clicks Score 17  -KW         Functional Assessment    Outcome Measure Options AM-PAC 6 Clicks Basic Mobility (PT)  -KW        User Key  (r) = Recorded By, (t) = Taken By, (c) = Cosigned By    Initials Name Provider Type    TW Jose Dillon, PTA Physical Therapy Assistant    CS Vani Wing, MIRANDA/GANESH Occupational Therapy Assistant    Beth Jones, OT Occupational Therapist    KW Pricilla Payne, PT Physical Therapist           Time Calculation:         Time Calculation- OT     Row Name 08/15/18 1249             Time Calculation- OT    OT Start Time 0846  -CS      OT Stop Time 0942  -CS      OT Time Calculation (min) 56 min  -CS      Total Timed Code Minutes- OT 56 minute(s)  -CS      OT Received On 08/15/18  -        User Key  (r) = Recorded By, (t) = Taken By, (c) = Cosigned By    Initials Name Provider Type    CS Vani Wing, MIRANDA/GANESH Occupational Therapy Assistant           Therapy Suggested Charges     Code   Minutes Charges    None           Therapy Charges for Today     Code Description Service Date Service Provider Modifiers Qty    28071981734 HC OT SELF CARE/MGMT/TRAIN EA 15 MIN 8/15/2018 Vani Wing COTA/L GO 2    05619209335 HC OT THER PROC EA 15 MIN 8/15/2018 Vani Wing COTA/L GO 2          OT G-codes  OT Professional Judgement Used?: Yes  OT Functional Scales Options: AM-PAC 6 Clicks Daily Activity (OT)  Score: 18  Functional Limitation: Self care  Self Care Current Status (): At least 40 percent but less than 60 percent impaired, limited or restricted  Self Care Goal Status (): At least 20 percent but less than 40 percent impaired, limited or restricted    MYRTLE Webb  8/15/2018

## 2018-08-15 NOTE — PLAN OF CARE
Problem: Patient Care Overview  Goal: Plan of Care Review  Outcome: Ongoing (interventions implemented as appropriate)   08/15/18 0326   Coping/Psychosocial   Plan of Care Reviewed With patient   Plan of Care Review   Progress no change   OTHER   Outcome Summary VSS, pain controlled with pain meds, resting well this shift, cont to monitor       Problem: Fall Risk (Adult)  Goal: Absence of Fall  Outcome: Ongoing (interventions implemented as appropriate)      Problem: Hip Arthroplasty (Total, Partial) (Adult)  Goal: Signs and Symptoms of Listed Potential Problems Will be Absent, Minimized or Managed (Hip Arthroplasty)  Outcome: Ongoing (interventions implemented as appropriate)

## 2018-08-15 NOTE — THERAPY TREATMENT NOTE
Acute Care - Physical Therapy Treatment Note  [facility]     Patient Name: Jamey Berry  : 1970  MRN: [id number]  Today's Date: 8/15/2018  Onset of Illness/Injury or Date of Surgery: 18  Date of Referral to PT: 18  Referring Physician: Luiz Durham MD    Admit Date: 2018    Visit Dx:    ICD-10-CM ICD-9-CM   1. Impaired functional mobility, balance, gait, and endurance Z74.09 V49.89   2. Post-traumatic osteoarthritis of right hip M16.51 715.25   3. Impaired mobility and activities of daily living Z74.09 799.89     Patient Active Problem List   Diagnosis   • Panic disorder   • Morbid obesity (CMS/HCC)   • Generalized anxiety disorder   • Elevated blood pressure reading without diagnosis of hypertension   • Depressive disorder   • Pain in lower limb   • Right hip pain   • Closed displaced fracture of medial malleolus of right tibia   • Post-traumatic osteoarthritis of right hip   • Closed left subtrochanteric femur fracture, with routine healing, subsequent encounter       Therapy Treatment          Rehabilitation Treatment Summary     Row Name 08/15/18 1510 08/15/18 1028 08/15/18 0846       Treatment Time/Intention    Discipline physical therapy assistant  -TW physical therapy assistant  -TW occupational therapy assistant  -CS    Document Type therapy note (daily note)  -TW therapy note (daily note)  -TW therapy note (daily note)  -CS    Subjective Information no complaints  -TW no complaints  -TW no complaints  -CS    Mode of Treatment physical therapy;individual therapy  -TW physical therapy;individual therapy  -TW occupational therapy  -CS    Therapy Frequency (OT Eval) --   7 days/wk  -TW --   7 days/wk  -TW  --    Patient Effort excellent  -TW excellent  -TW excellent  -CS2    Existing Precautions/Restrictions fall;hip, posterior;other (see comments)   please monitor vitals closely  -TW fall;hip, posterior;other (see comments)   please monitor vitals closely  -TW  fall;hip, posterior;other (see comments)  -CS3    Equipment Issued to Patient gait belt  -TW gait belt  -TW  --    Recorded by [TW] Jose Dillon, PTA 08/15/18 1554 [TW] Jose Dillon, PTA 08/15/18 1125 [CS] Vani Wing, JEAN/L 08/15/18 0857  [CS2] Vani Wing, JEAN/L 08/15/18 0938  [CS3] Vani Wing JEAN/L 08/15/18 1152    Row Name 08/15/18 1510 08/15/18 1028 08/15/18 0846       Vital Signs    Pre Systolic BP Rehab  -- 113  -  -CS    Pre Treatment Diastolic BP  -- 63  -TW 69  -CS    Post Systolic BP Rehab  -- 134  -TW  --    Post Treatment Diastolic BP  -- 71  -TW  --    Pretreatment Heart Rate (beats/min) 91  -  -TW 95  -CS    Posttreatment Heart Rate (beats/min) 96  -TW 98  -  -CS2    Pre SpO2 (%) 90   Pt able to get O2 at 94% with PLB.  -TW 92  -TW 92  -CS    O2 Delivery Pre Treatment room air  -TW room air  -TW room air  -CS    Post SpO2 (%) 97  -TW 95  -TW 93  -CS2    O2 Delivery Post Treatment  --  -- room air  -CS2    Pre Patient Position Supine  -TW Supine  -TW Supine  -CS    Intra Patient Position Standing  -TW Standing  -TW Standing  -CS2    Post Patient Position Supine  -TW Sitting  -TW Supine  -CS2    Recorded by [TW] Jose Dillon, PTA 08/15/18 1554 [TW] Jose Dillon, PTA 08/15/18 1125 [CS] Vani Wing JEAN/L 08/15/18 0857  [CS2] Vani Wing JEAN/L 08/15/18 0938    Row Name 08/15/18 1510 08/15/18 1028 08/15/18 0846       Cognitive Assessment/Intervention- PT/OT    Affect/Mental Status (Cognitive) WNL;WFL  -TW WNL;WFL  -TW WFL  -CS    Orientation Status (Cognition) oriented x 4  -TW oriented x 4  -TW oriented x 4  -CS    Follows Commands (Cognition) WNL;WFL  -TW WNL;WFL  -TW WNL;WFL  -CS    Cognitive Function (Cognitive) WNL;WFL  -TW WNL;WFL  -TW  --    Personal Safety Interventions fall prevention program maintained;gait belt;nonskid shoes/slippers when out of bed  -TW fall prevention program maintained;gait belt;nonskid  shoes/slippers when out of bed  -TW  --    Cognitive Assessment/Intervention Comment Pt is able to recall hip precautions with no cuing needed.  -TW Pt was able to recall hip precautions from yesterday with very little cuing.  -TW  --    Recorded by [TW] Jose Dillon, PTA 08/15/18 1554 [TW] Jose Dillon, PTA 08/15/18 1125 [CS] Vani Wing COTA/L 08/15/18 0938    Row Name 08/15/18 1510 08/15/18 1028          Safety Issues, Functional Mobility    Impairments Affecting Function (Mobility) pain  -TW pain  -TW     Recorded by [TW] Jose Dillon, PTA 08/15/18 1554 [TW] Jose Dillon, PTA 08/15/18 1125     Row Name 08/15/18 1510 08/15/18 1028          Mobility Assessment/Intervention    Extremity Weight-bearing Status right lower extremity  -TW right lower extremity  -TW     Right Lower Extremity (Weight-bearing Status) weight-bearing as tolerated (WBAT)  -TW weight-bearing as tolerated (WBAT)  -TW     Recorded by [TW] Jose Dillon, PTA 08/15/18 1554 [TW] Jose Dillon, PTA 08/15/18 1125     Row Name 08/15/18 1510 08/15/18 1028 08/15/18 0846       Bed Mobility Assessment/Treatment    Bed Mobility Assessment/Treatment supine-sit;sit-supine  -TW supine-sit  -TW supine-sit  -CS    Supine-Sit [place] (Bed Mobility) supervision  -TW supervision  -TW supervision  -CS    Sit-Supine [place] (Bed Mobility) contact guard;supervision   for LE. Pt was instructed on using gait belt to assist.  -TW  --  --    Assistive Device (Bed Mobility) head of bed elevated;leg   -TW bed rails  -TW bed rails;overhead trapeze  -CS    Recorded by [TW] Jose Dillon, PTA 08/15/18 1554 [TW] Jose Dillon, PTA 08/15/18 1125 [CS] Vani Wing COTA/L 08/15/18 1152    Row Name 08/15/18 1510 08/15/18 1028 08/15/18 0846       Functional Mobility    Functional Mobility- Ind. Level contact guard assist  -TW contact guard assist  -TW contact guard assist  -CS    Functional Mobility-  Device rolling walker  -TW rolling walker  -TW rolling walker  -CS    Functional Mobility-Distance (Feet)  --  -- 15  -CS    Recorded by [TW] Jose Dillon, PTA 08/15/18 1554 [TW] Jose Dillon, PTA 08/15/18 1125 [CS] Vani Wing JEAN/L 08/15/18 1152    Row Name 08/15/18 1510 08/15/18 1028 08/15/18 0846       Transfer Assessment/Treatment    Transfer Assessment/Treatment sit-stand transfer;stand-sit transfer  -TW sit-stand transfer;stand-sit transfer  -TW sit-stand transfer;stand-sit transfer;toilet transfer  -CS    Recorded by [TW] Jose Dillon, PTA 08/15/18 1554 [TW] Jose Dillon, PTA 08/15/18 1125 [CS] Vani Wing COTA/L 08/15/18 1152    Row Name 08/15/18 1510 08/15/18 1028 08/15/18 0846       Sit-Stand Transfer    Sit-Stand [place] (Transfers) verbal cues;stand by assist  -TW verbal cues;stand by assist  -TW supervision  -CS    Assistive Device (Sit-Stand Transfers) walker, front-wheeled  -TW walker, front-wheeled  -TW walker, front-wheeled  -CS    Recorded by [TW] Jose Dillon, PTA 08/15/18 1554 [TW] Jose Dillon, PTA 08/15/18 1125 [CS] Vani Wing COTA/L 08/15/18 1152    Row Name 08/15/18 1510 08/15/18 1028 08/15/18 0846       Stand-Sit Transfer    Stand-Sit [place] (Transfers) verbal cues;stand by assist  -TW verbal cues;stand by assist  -TW supervision  -CS    Assistive Device (Stand-Sit Transfers) walker, front-wheeled  -TW walker, front-wheeled  -TW walker, front-wheeled  -CS    Recorded by [TW] Jose Dillon, PTA 08/15/18 1554 [TW] Jose Dillon, PTA 08/15/18 1125 [CS] Vani Wing JEAN/L 08/15/18 1152    Row Name 08/15/18 0846             Toilet Transfer    Type (Toilet Transfer) sit-stand;stand-sit  -CS      [place] Level (Toilet Transfer) contact guard;supervision  -CS      Assistive Device (Toilet Transfer) grab bars/safety frame;raised toilet seat  -CS      Recorded by [CS] Vani Wing COTA/L 08/15/18  1152      Row Name 08/15/18 1510 08/15/18 1028          Gait/Stairs Assessment/Training    Gait/Stairs Assessment/Training gait/ambulation assistive device  -TW gait/ambulation assistive device  -TW     [place] Level (Gait) stand by assist  -TW stand by assist  -TW     Assistive Device (Gait) walker, front-wheeled  -TW walker, front-wheeled  -TW     Distance in Feet (Gait) 140ft  -  -TW     Deviations/Abnormal Patterns (Gait) gait speed decreased  -TW gait speed decreased  -TW     Recorded by [TW] Jose Dillon, PTA 08/15/18 0837 [TW] Jose Dillon, PTA 08/15/18 1125     Row Name 08/15/18 0846             ADL Assessment/Intervention    BADL Assessment/Intervention bathing;grooming;toileting  -CS      Recorded by [CS] Vani Wing COTA/L 08/15/18 1152      Row Name 08/15/18 0846             Bathing Assessment/Intervention    Comment (Bathing) Pt deferred to get a bath this date.  -CS      Recorded by [CS] Vani Wing COTA/L 08/15/18 1152      Row Name 08/15/18 0846             Grooming Assessment/Training    [place] Level (Grooming) grooming skills;oral care regimen;wash face, hands;supervision  -CS      Grooming Position sink side;supported standing  -CS      Recorded by [CS] Vani Wing COTA/L 08/15/18 1152      Row Name 08/15/18 0846             Toileting Assessment/Training    [place] Level (Toileting) toileting skills;supervision;contact guard assist  -CS      Assistive Devices (Toileting) raised toilet seat;grab bar/safety frame  -CS      Toileting Position supported standing  -CS      Recorded by [CS] Vani Wing COTA/L 08/15/18 1152      Row Name 08/15/18 1510 08/15/18 1028          General Assessment (Manual Muscle Testing)    General Manual Muscle Testing (MMT) Assessment no strength deficits identified   BUE 5/5  -TW no strength deficits identified   BUE 5/5  -TW     Recorded by [TW] Jose Dillon, PTA 08/15/18 1559 [TW] Jose Dillon,  PTA 08/15/18 1125     Row Name 08/15/18 1510 08/15/18 0846          Therapeutic Exercise    Upper Extremity (Therapeutic Exercise)  -- bicep curl, bilateral  -CS     Upper Extremity Range of Motion (Therapeutic Exercise)  -- shoulder flexion/extension, bilateral;shoulder internal/external rotation, bilateral;elbow flexion/extension, bilateral;forearm supination/pronation, bilateral;wrist flexion/extension, bilateral  -CS     Hand (Therapeutic Exercise)  -- finger flexion/extension, bilateral  -CS     Lower Extremity (Therapeutic Exercise) gluteal sets;quad sets, bilateral;heel slides, right   AP's bilaterally.  -TW  --     Weight/Resistance (Therapeutic Exercise)  -- 3 pounds  -CS     Exercise Type (Therapeutic Exercise) AROM (active range of motion)  -TW AROM (active range of motion)  -CS     Position (Therapeutic Exercise) supine  -TW seated  -CS     Sets/Reps (Therapeutic Exercise) 2/10  -TW 1/20  -CS     Equipment (Therapeutic Exercise)  -- free weight, barbell  -CS     Expected Outcome (Therapeutic Exercise)  -- improve functional tolerance, self-care activity;improve performance, transfer skills  -CS     Recorded by [TW] Jose Dillon, PTA 08/15/18 1554 [CS] Vani Wing JEAN/L 08/15/18 1152     Row Name 08/15/18 1510 08/15/18 1028 08/15/18 0846       Positioning and Restraints    Pre-Treatment Position in bed  -TW in bed  -TW in bed  -CS    Post Treatment Position bed  -TW chair  -TW bed  -CS2    In Bed supine;call light within reach;encouraged to call for assist;exit alarm on  -TW  -- sitting EOB;call light within reach;encouraged to call for assist  -CS3    In Chair  -- reclined;call light within reach;encouraged to call for assist  -TW  --    Recorded by [TW] Jose Dillon, PTA 08/15/18 1554 [TW] Jose Dillon, PTA 08/15/18 1125 [CS] Vani Wing, JEAN/L 08/15/18 0857  [CS2] Vani Wing, JEAN/L 08/15/18 0938  [CS3] Vani Wing, JEAN/L 08/15/18 1152    Row Name  08/15/18 1510 08/15/18 1028 08/15/18 0846       Pain Scale: Numbers Pre/Post-Treatment    Pain Scale: Numbers, Pretreatment 0/10 - no pain  -TW 0/10 - no pain  -TW 2/10  -CS    Pain Scale: Numbers, Post-Treatment 1/10  -TW 1/10  -TW 2/10  -CS2    Pain Location - Side Right  -TW Right  -TW Right  -CS    Pain Location - Orientation  --  -- generalized  -CS    Pain Location hip  -TW hip  -TW hip  -CS    Recorded by [TW] Jose Dillon, PTA 08/15/18 1554 [TW] Jose Dillon, PTA 08/15/18 1125 [CS] Vani Wing COTA/L 08/15/18 0857  [CS2] Vani Wing COTA/L 08/15/18 1152    Row Name 08/15/18 1510 08/15/18 1028          Sensory Assessment/Intervention    Sensory General Assessment no sensation deficits identified   BUE intact  -TW no sensation deficits identified   BUE intact  -TW     Recorded by [TW] Jose Dillon, PTA 08/15/18 1554 [TW] Jose Dillon, PTA 08/15/18 1125     Row Name 08/15/18 1510 08/15/18 1028          Vision Assessment/Intervention    Visual Impairment/Limitations corrective lenses for reading  -TW corrective lenses for reading  -TW     Recorded by [TW] Jose Dillon, PTA 08/15/18 1554 [TW] Jose Dillon, PTA 08/15/18 1125     Row Name                Wound 08/14/18 1028 Right hip surgical    Wound - Properties Group Date first assessed: 08/14/18 [KW] Time first assessed: 1028 [KW] Present On Admission : no [KW] Side: Right [KW] Location: hip [KW] Type: surgical [KW] Recorded by:  [KW] Denise Mcmanus RN 08/14/18 1028    Row Name 08/15/18 1510 08/15/18 1028 08/15/18 0846       Outcome Summary/Treatment Plan (OT)    Daily Summary of Progress (OT)  --  -- progress toward functional goals as expected  -CS    Plan for Continued Treatment (OT)  --  -- cont OT POC  -CS    Anticipated Discharge Disposition (OT) --  -TW --  -TW home with assist;home with OP services  -CS    Recorded by [TW] Jose Dillon, PTA 08/15/18 1554 [TW] Jose Dillon, PTA  08/15/18 1554 [CS] MaciejVani, JEAN/L 08/15/18 1152    Row Name 08/15/18 1510 08/15/18 1028          Outcome Summary/Treatment Plan (PT)    Daily Summary of Progress (PT) progress toward functional goals as expected  -TW progress toward functional goals as expected  -TW     Barriers to Overall Progress (PT) stiffness  -TW Pain  -TW     Plan for Continued Treatment (PT) Cont per POC.  -TW Cont with POC  -TW     Anticipated Discharge Disposition (PT) home with OP services  -TW home with OP services  -TW     Recorded by [TW] Jose Dillon, PTA 08/15/18 1554 [TW] Jose Dillon, PTA 08/15/18 1125       User Key  (r) = Recorded By, (t) = Taken By, (c) = Cosigned By    Initials Name Effective Dates Discipline    TW Jose Dillon, PTA 03/07/18 -  PT    CS Vani Wing, JEAN/L 03/07/18 -  OT    KW Denise Mcmanus RN 10/17/16 -  Nurse          Wound 08/14/18 1028 Right hip surgical (Active)   Dressing Appearance intact;dry;no drainage 8/15/2018  8:09 AM   Wound Output (mL) 100 8/15/2018  5:00 AM               PT Rehab Goals     Row Name 08/15/18 1510 08/15/18 1028          Bed Mobility Goal 1 (PT)    Activity/Assistive Device (Bed Mobility Goal 1, PT) rolling to left;rolling to right;sit to supine/supine to sit  -TW rolling to left;rolling to right;sit to supine/supine to sit  -TW     [place] Level/Cues Needed (Bed Mobility Goal 1, PT) independent  -TW independent  -TW     Time Frame (Bed Mobility Goal 1, PT) 3 days;short term goal (STG)  -TW 3 days;short term goal (STG)  -TW     Progress/Outcomes (Bed Mobility Goal 1, PT) continuing progress toward goal;goal not met  -TW continuing progress toward goal;goal not met  -TW        Transfer Goal 1 (PT)    Activity/Assistive Device (Transfer Goal 1, PT) sit-to-stand/stand-to-sit;bed-to-chair/chair-to-bed;walker, rolling  -TW sit-to-stand/stand-to-sit;bed-to-chair/chair-to-bed;walker, rolling  -TW     [place] Level/Cues Needed (Transfer Goal  1, PT) conditional independence  -TW conditional independence  -TW     Time Frame (Transfer Goal 1, PT) short term goal (STG);3 days  -TW short term goal (STG);3 days  -TW     Progress/Outcome (Transfer Goal 1, PT) goal not met  -TW continuing progress toward goal;goal not met  -TW        Gait Training Goal 1 (PT)    Activity/Assistive Device (Gait Training Goal 1, PT) gait (walking locomotion);assistive device use;decrease fall risk;walker, rolling  -TW gait (walking locomotion);assistive device use;decrease fall risk;walker, rolling  -TW     [place] Level (Gait Training Goal 1, PT) conditional independence  -TW conditional independence  -TW     Distance (Gait Goal 1, PT) 150 ft or more  - ft or more  -TW     Time Frame (Gait Training Goal 1, PT) 5 days  -TW 5 days  -TW     Progress/Outcome (Gait Training Goal 1, PT) goal not met  -TW continuing progress toward goal;goal not met  -TW        Stairs Goal 1 (PT)    Activity/Assistive Device (Stairs Goal 1, PT) ascending stairs;descending stairs;using handrail, right;using handrail, left  -TW ascending stairs;descending stairs;using handrail, right;using handrail, left  -TW     [place] Level/Cues Needed (Stairs Goal 1, PT) conditional independence  -TW conditional independence  -TW     Number of Stairs (Stairs Goal 1, PT) 12  -TW 12  -TW     Time Frame (Stairs Goal 1, PT) 10 days  -TW 10 days  -TW     Progress/Outcome (Stairs Goal 1, PT) goal not met  -TW continuing progress toward goal;goal not met  -TW        Patient Education Goal (PT)    Activity (Patient Education Goal, PT) HEP   -TW HEP   -TW     [place]/Cues/Accuracy (Memory Goal 2, PT) demonstrates adequately  -TW demonstrates adequately  -TW     Time Frame (Patient Education Goal, PT) by discharge  -TW by discharge  -TW     Progress/Outcome (Patient Education Goal, PT) goal met  -TW continuing progress toward goal;goal not met  -TW       User Key  (r) = Recorded By, (t) = Taken By, (c) =  Cosigned By    Initials Name Provider Type Discipline    TW Jose Dillon, PTA Physical Therapy Assistant PT          Physical Therapy Education     Title: PT OT SLP Therapies (Active)     Topic: Physical Therapy (Active)     Point: Mobility training (Done)    Learning Progress Summary     Learner Status Readiness Method Response Comment Documented by    Patient Done Acceptance E,D VU,NR Hip precautions reviewed to avoid hip flexion past 90 deg, hip IR, and hip adduction KW 08/14/18 1511    Significant Other Done Acceptance E,D VU,NR Hip precautions reviewed to avoid hip flexion past 90 deg, hip IR, and hip adduction  08/14/18 1511          Point: Precautions (Done)    Learning Progress Summary     Learner Status Readiness Method Response Comment Documented by    Patient Done Acceptance E,D VU,NR Hip precautions reviewed to avoid hip flexion past 90 deg, hip IR, and hip adduction KW 08/14/18 1511    Significant Other Done Acceptance E,D VU,NR Hip precautions reviewed to avoid hip flexion past 90 deg, hip IR, and hip adduction KW 08/14/18 1511                      User Key     Initials Effective Dates Name Provider Type Discipline     07/23/18 -  Pricilla Payne, PT Physical Therapist PT                    PT Recommendation and Plan  Anticipated Discharge Disposition (PT): home with OP services  Outcome Summary/Treatment Plan (PT)  Daily Summary of Progress (PT): progress toward functional goals as expected  Barriers to Overall Progress (PT): stiffness  Plan for Continued Treatment (PT): Cont per POC.  Anticipated Discharge Disposition (PT): home with OP services  Plan of Care Reviewed With: patient  Progress: improving  Outcome Summary: Pt cont to make improvments with t/fs and gait but still needs assistance with R LE with sit to sup t/f's but is able to perform same t/f' using gait belt to assist with lifting R LE with supervision only.          Outcome Measures     Row Name 08/15/18 1510 08/15/18 1100  08/15/18 1028       How much help from another person do you currently need...    Turning from your back to your side while in flat bed without using bedrails? 4  -TW  -- 4  -TW    Moving from lying on back to sitting on the side of a flat bed without bedrails? 4  -TW  -- 4  -TW    Moving to and from a bed to a chair (including a wheelchair)? 4  -TW  -- 4  -TW    Standing up from a chair using your arms (e.g., wheelchair, bedside chair)? 4  -TW  -- 4  -TW    Climbing 3-5 steps with a railing? 3  -TW  -- 3  -TW    To walk in hospital room? 4  -TW  -- 4  -TW    AM-PAC 6 Clicks Score 23  -TW  -- 23  -TW       How much help from another is currently needed...    Putting on and taking off regular lower body clothing?  -- 2  -CS  --    Bathing (including washing, rinsing, and drying)  -- 2  -CS  --    Toileting (which includes using toilet bed pan or urinal)  -- 2  -CS  --    Putting on and taking off regular upper body clothing  -- 4  -CS  --    Taking care of personal grooming (such as brushing teeth)  -- 4  -CS  --    Eating meals  -- 4  -CS  --    Score  -- 18  -CS  --       Functional Assessment    Outcome Measure Options AM-PAC 6 Clicks Basic Mobility (PT)  -TW AM-PAC 6 Clicks Daily Activity (OT)  -CS AM-[facility] 6 Clicks Basic Mobility (PT)  -TW    Row Name 08/14/18 1337 08/14/18 1336          How much help from another person do you currently need...    Turning from your back to your side while in flat bed without using bedrails?  -- 3  -KW     Moving from lying on back to sitting on the side of a flat bed without bedrails?  -- 3  -KW     Moving to and from a bed to a chair (including a wheelchair)?  -- 3  -KW     Standing up from a chair using your arms (e.g., wheelchair, bedside chair)?  -- 3  -KW     Climbing 3-5 steps with a railing?  -- 2  -KW     To walk in hospital room?  -- 3  -KW     AM-PAC 6 Clicks Score  -- 17  -KW        How much help from another is currently needed...    Putting on and taking off regular  lower body clothing? 2  -MR  --     Bathing (including washing, rinsing, and drying) 2  -MR  --     Toileting (which includes using toilet bed pan or urinal) 2  -MR  --     Putting on and taking off regular upper body clothing 4  -MR  --     Taking care of personal grooming (such as brushing teeth) 4  -MR  --     Eating meals 4  -MR  --     Score 18  -MR  --        Functional Assessment    Outcome Measure Options AM-PAC 6 Clicks Daily Activity (OT)  -MR AM-PAC 6 Clicks Basic Mobility (PT)  -KW       User Key  (r) = Recorded By, (t) = Taken By, (c) = Cosigned By    Initials Name Provider Type    TW Jose Dillon PTA Physical Therapy Assistant    Vani Lee, MIRANDA/GANESH Occupational Therapy Assistant    MR Beth Jackman, OT Occupational Therapist    KW Pricilla Payne, PT Physical Therapist           Time Calculation:         PT Charges     Row Name 08/15/18 1558 08/15/18 1127          Time Calculation    Start Time 1510  -TW 1028  -TW     Stop Time 1540  -TW 1057  -TW     Time Calculation (min) 30 min  -TW 29 min  -TW     PT Received On 08/15/18  -TW 08/15/18  -TW     PT Goal Re-Cert Due Date 08/20/18  -TW 08/20/18  -TW        Time Calculation- PT    Total Timed Code Minutes- PT 30 minute(s)  -TW 29 minute(s)  -TW       User Key  (r) = Recorded By, (t) = Taken By, (c) = Cosigned By    Initials Name Provider Type    TW Jose Dillon PTA Physical Therapy Assistant        Therapy Suggested Charges     Code   Minutes Charges    None           Therapy Charges for Today     Code Description Service Date Service Provider Modifiers Qty    27908255097 HC GAIT TRAINING EA 15 MIN 8/15/2018 Jose Dillon, PTA GP 1    33281537133 HC PT THERAPEUTIC ACT EA 15 MIN 8/15/2018 Jose Dillon, PTA GP 1    96710307971 HC GAIT TRAINING EA 15 MIN 8/15/2018 Jose Dillon PTA GP 1    87104893233 HC PT THERAPEUTIC ACT EA 15 MIN 8/15/2018 Jose Dillon, PTA GP 1          PT G-Codes  PT Professional  Judgement Used?: Yes  Outcome Measure Options: AM-PAC 6 Clicks Basic Mobility (PT)  Score: 17  Functional Limitation: Mobility: Walking and moving around  Mobility: Walking and Moving Around Current Status (): At least 40 percent but less than 60 percent impaired, limited or restricted  Mobility: Walking and Moving Around Goal Status (): At least 1 percent but less than 20 percent impaired, limited or restricted    Jose Dillon, PTA  8/15/2018

## 2018-08-15 NOTE — PLAN OF CARE
Problem: Patient Care Overview  Goal: Plan of Care Review  Outcome: Ongoing (interventions implemented as appropriate)   08/15/18 4254   Coping/Psychosocial   Plan of Care Reviewed With patient   Plan of Care Review   Progress improving   OTHER   Outcome Summary Pt tolerated tx well this date. Pt was SBA with bed mobility. Pt was CGA/SBA with toilet t/f. Pt was SBA with grooming task at sink. Continue OT POC.

## 2018-08-15 NOTE — PLAN OF CARE
Problem: Patient Care Overview  Goal: Plan of Care Review  Outcome: Ongoing (interventions implemented as appropriate)   08/15/18 1510   Coping/Psychosocial   Plan of Care Reviewed With patient   Plan of Care Review   Progress improving   OTHER   Outcome Summary Pt cont to make improvments with t/fs and gait but still needs assistance with R LE with sit to sup t/f's but is able to perform same t/f' using gait belt to assist with lifting R LE with supervision only.

## 2018-08-15 NOTE — NURSING NOTE
Talked with Dr. Durham.  He stated no therapy orders at discharge needed at this time.  He also said to dc drain and leave dry dressing.

## 2018-08-15 NOTE — THERAPY DISCHARGE NOTE
Acute Care - Physical Therapy Discharge Summary  [facility]       Patient Name: Jamey Berry  : 1970  MRN: [id number]    Today's Date: 8/15/2018  Onset of Illness/Injury or Date of Surgery: 18    Date of Referral to PT: 18  Referring Physician: Luiz Durham MD      Admit Date: 2018      PT Recommendation and Plan    Visit Dx:    ICD-10-CM ICD-9-CM   1. Impaired functional mobility, balance, gait, and endurance Z74.09 V49.89   2. Post-traumatic osteoarthritis of right hip M16.51 715.25   3. Impaired mobility and activities of daily living Z74.09 799.89             Outcome Measures     Row Name 08/15/18 1510 08/15/18 1100 08/15/18 1028       How much help from another person do you currently need...    Turning from your back to your side while in flat bed without using bedrails? 4  -TW  -- 4  -TW    Moving from lying on back to sitting on the side of a flat bed without bedrails? 4  -TW  -- 4  -TW    Moving to and from a bed to a chair (including a wheelchair)? 4  -TW  -- 4  -TW    Standing up from a chair using your arms (e.g., wheelchair, bedside chair)? 4  -TW  -- 4  -TW    Climbing 3-5 steps with a railing? 3  -TW  -- 3  -TW    To walk in hospital room? 4  -TW  -- 4  -TW    AM-PAC 6 Clicks Score 23  -TW  -- 23  -TW       How much help from another is currently needed...    Putting on and taking off regular lower body clothing?  -- 2  -CS  --    Bathing (including washing, rinsing, and drying)  -- 2  -CS  --    Toileting (which includes using toilet bed pan or urinal)  -- 2  -CS  --    Putting on and taking off regular upper body clothing  -- 4  -CS  --    Taking care of personal grooming (such as brushing teeth)  -- 4  -CS  --    Eating meals  -- 4  -CS  --    Score  -- 18  -CS  --       Functional Assessment    Outcome Measure Options AM-PAC 6 Clicks Basic Mobility (PT)  -TW AM-PAC 6 Clicks Daily Activity (OT)  -CS AM-PAC 6 Clicks Basic Mobility (PT)  -TW    Row Name  08/14/18 1337 08/14/18 1336          How much help from another person do you currently need...    Turning from your back to your side while in flat bed without using bedrails?  -- 3  -KW     Moving from lying on back to sitting on the side of a flat bed without bedrails?  -- 3  -KW     Moving to and from a bed to a chair (including a wheelchair)?  -- 3  -KW     Standing up from a chair using your arms (e.g., wheelchair, bedside chair)?  -- 3  -KW     Climbing 3-5 steps with a railing?  -- 2  -KW     To walk in hospital room?  -- 3  -KW     AM-PAC 6 Clicks Score  -- 17  -KW        How much help from another is currently needed...    Putting on and taking off regular lower body clothing? 2  -MR  --     Bathing (including washing, rinsing, and drying) 2  -MR  --     Toileting (which includes using toilet bed pan or urinal) 2  -MR  --     Putting on and taking off regular upper body clothing 4  -MR  --     Taking care of personal grooming (such as brushing teeth) 4  -MR  --     Eating meals 4  -MR  --     Score 18  -MR  --        Functional Assessment    Outcome Measure Options AM-PAC 6 Clicks Daily Activity (OT)  -MR AM-PAC 6 Clicks Basic Mobility (PT)  -KW       User Key  (r) = Recorded By, (t) = Taken By, (c) = Cosigned By    Initials Name Provider Type    TW Jose Dillon, PTA Physical Therapy Assistant    CS Vani Wing, MIRNADA/GANESH Occupational Therapy Assistant    MR Beth Jackman, OT Occupational Therapist    KW Pricilla Payne, PT Physical Therapist                PT Charges     Row Name 08/15/18 1558 08/15/18 1127          Time Calculation    Start Time 1510  -TW 1028  -TW     Stop Time 1540  -TW 1057  -TW     Time Calculation (min) 30 min  -TW 29 min  -TW     PT Received On 08/15/18  -TW 08/15/18  -TW     PT Goal Re-Cert Due Date 08/20/18  -TW 08/20/18  -TW        Time Calculation- PT    Total Timed Code Minutes- PT 30 minute(s)  -TW 29 minute(s)  -TW       User Key  (r) = Recorded By, (t) = Taken  By, (c) = Cosigned By    Initials Name Provider Type    TW Jose Dillon PTA Physical Therapy Assistant        Therapy Suggested Charges     Code   Minutes Charges    None                   PT Rehab Goals     Row Name 08/15/18 1510 08/15/18 1028          Bed Mobility Goal 1 (PT)    Activity/Assistive Device (Bed Mobility Goal 1, PT) rolling to left;rolling to right;sit to supine/supine to sit  -TW rolling to left;rolling to right;sit to supine/supine to sit  -TW     [place] Level/Cues Needed (Bed Mobility Goal 1, PT) independent  -TW independent  -TW     Time Frame (Bed Mobility Goal 1, PT) 3 days;short term goal (STG)  -TW 3 days;short term goal (STG)  -TW     Progress/Outcomes (Bed Mobility Goal 1, PT) continuing progress toward goal;goal not met  -TW continuing progress toward goal;goal not met  -TW        Transfer Goal 1 (PT)    Activity/Assistive Device (Transfer Goal 1, PT) sit-to-stand/stand-to-sit;bed-to-chair/chair-to-bed;walker, rolling  -TW sit-to-stand/stand-to-sit;bed-to-chair/chair-to-bed;walker, rolling  -TW     [place] Level/Cues Needed (Transfer Goal 1, PT) conditional independence  -TW conditional independence  -TW     Time Frame (Transfer Goal 1, PT) short term goal (STG);3 days  -TW short term goal (STG);3 days  -TW     Progress/Outcome (Transfer Goal 1, PT) goal not met  -TW continuing progress toward goal;goal not met  -TW        Gait Training Goal 1 (PT)    Activity/Assistive Device (Gait Training Goal 1, PT) gait (walking locomotion);assistive device use;decrease fall risk;walker, rolling  -TW gait (walking locomotion);assistive device use;decrease fall risk;walker, rolling  -TW     [place] Level (Gait Training Goal 1, PT) conditional independence  -TW conditional independence  -TW     Distance (Gait Goal 1, PT) 150 ft or more  - ft or more  -TW     Time Frame (Gait Training Goal 1, PT) 5 days  -TW 5 days  -TW     Progress/Outcome (Gait Training Goal 1, PT) goal  not met  -TW continuing progress toward goal;goal not met  -TW        Stairs Goal 1 (PT)    Activity/Assistive Device (Stairs Goal 1, PT) ascending stairs;descending stairs;using handrail, right;using handrail, left  -TW ascending stairs;descending stairs;using handrail, right;using handrail, left  -TW     Cole Level/Cues Needed (Stairs Goal 1, PT) conditional independence  -TW conditional independence  -TW     Number of Stairs (Stairs Goal 1, PT) 12  -TW 12  -TW     Time Frame (Stairs Goal 1, PT) 10 days  -TW 10 days  -TW     Progress/Outcome (Stairs Goal 1, PT) goal not met  -TW continuing progress toward goal;goal not met  -TW        Patient Education Goal (PT)    Activity (Patient Education Goal, PT) HEP   -TW HEP   -TW     Cole/Cues/Accuracy (Memory Goal 2, PT) demonstrates adequately  -TW demonstrates adequately  -TW     Time Frame (Patient Education Goal, PT) by discharge  -TW by discharge  -TW     Progress/Outcome (Patient Education Goal, PT) goal met  -TW continuing progress toward goal;goal not met  -TW       User Key  (r) = Recorded By, (t) = Taken By, (c) = Cosigned By    Initials Name Provider Type Discipline    TW Jose Dillon, PTA Physical Therapy Assistant PT              PT Discharge Summary  Anticipated Discharge Disposition (PT): home with OP services  Reason for Discharge: Discharge from facility, Per MD order  Outcomes Achieved: Patient able to partially acheive established goals, Refer to plan of care for updates on goals achieved  Discharge Destination: Home      Lexie Woodward, PT   8/15/2018

## 2018-08-15 NOTE — PROGRESS NOTES
ORTHOPEDIC PROGRESS NOTE:    Name:  Jamey Berry  Date:    8/15/2018  Date of admission:  8/14/2018    Post op day:  1 Day Post-Op  Procedure:    Procedure(s) (LRB):  HARDWARE REMOVAL RIGHT FEMUR (Right)  HARDWARE REMOVAL RIGHT HIP (Right)  TOTAL HIP REPLACEMENT POSTERIOR APPROACH (Right)    Subjective: Doing well, no complaints    Vitals:    Vitals:    08/15/18 0420   BP: 120/69   Pulse: 106   Resp: 20   Temp: 97.6 °F (36.4 °C)   SpO2: 97%       Exam: Leg lengths very close, NVI    Lab Results (last 24 hours)     Procedure Component Value Units Date/Time    CBC (No Diff) [[id number]]  (Abnormal) Collected:  08/15/18 0540    Specimen:  Blood Updated:  08/15/18 0659     WBC 8.83 10*3/mm3      RBC 4.18 (L) 10*6/mm3      Hemoglobin 12.2 (L) g/dL      Comment: SPECIMEN REANALYZED TO CONFIRM HGB        Hematocrit 36.4 (L) %      MCV 87.1 fL      MCH 29.2 pg      MCHC 33.5 g/dL      RDW 14.2 %      RDW-SD 45.2 (H) fl      MPV 9.5 fL      Platelets 235 10*3/mm3     Basic Metabolic Panel [[id number]]  (Abnormal) Collected:  08/15/18 0540    Specimen:  Blood Updated:  08/15/18 0658     Glucose 124 (H) mg/dL      BUN 13 mg/dL      Creatinine 0.65 (L) mg/dL      Sodium 136 (L) mmol/L      Potassium 4.1 mmol/L      Chloride 102 mmol/L      CO2 25.0 mmol/L      Calcium 8.1 (L) mg/dL      eGFR Non African Amer 132 mL/min/1.73      BUN/Creatinine Ratio 20.0     Anion Gap 9.0 mmol/L     Tissue Pathology Exam [[id number]] Collected:  08/14/18 0903    Specimen:  Tissue from Hip, Right; Tissue from Hip, Right Updated:  08/14/18 1208    Blood Gas, Arterial With Co-Ox [[id number]]  (Abnormal) Collected:  08/14/18 0945    Specimen:  Arterial Blood Updated:  08/14/18 0953     Site Arterial Line     Eric's Test N/A     pH, Arterial 7.382 pH units      pCO2, Arterial 36.2 mm Hg      pO2, Arterial 126.0 (H) mm Hg      HCO3, Arterial 21.5 mmol/L      Base Excess, Arterial -3.1 (L) mmol/L      O2 Saturation, Arterial 99.2 (H) %       Hemoglobin, Blood Gas 12.7 (L) g/dL      Hematocrit, Blood Gas 38.8 %      Oxyhemoglobin 97.7 %      Methemoglobin 0.60 %      Carboxyhemoglobin 0.9 %      Sodium, Arterial 140 mmol/L      Potassium, Arterial 3.6 mmol/L      Ionized Calcium 3.76 (L) mg/dL      Glucose, Arterial 120 (H) mmol/L      Barometric Pressure for Blood Gas 749 mmHg      Modality N/A     Ventilator Mode NA     Collected by or     pH, Temp Corrected -- pH Units      pCO2, Temperature Corrected -- mm Hg      pO2, Temperature Corrected -- mm Hg     Wound Culture - Wound, Hip, Right [[id number]] Collected:  08/14/18 0904    Specimen:  Wound from Hip, Right Updated:  08/14/18 0921     Gram Stain Result Rare (1+) WBCs seen      No organisms seen          ASSESSMENT:  Principal Problem:    Post-traumatic osteoarthritis of right hip  Active Problems:    Right hip pain        PLAN: DC drain today , progressing well , on po pain meds, probable dc this afternoon      Luiz Durham MD  08/15/18  7:37 AM

## 2018-08-15 NOTE — THERAPY TREATMENT NOTE
Acute Care - Physical Therapy Treatment Note  [facility]     Patient Name: Jamey Berry  : 1970  MRN: [id number]  Today's Date: 8/15/2018  Onset of Illness/Injury or Date of Surgery: 18  Date of Referral to PT: 18  Referring Physician: Luiz Durham MD    Admit Date: 2018    Visit Dx:    ICD-10-CM ICD-9-CM   1. Impaired functional mobility, balance, gait, and endurance Z74.09 V49.89   2. Post-traumatic osteoarthritis of right hip M16.51 715.25   3. Impaired mobility and activities of daily living Z74.09 799.89     Patient Active Problem List   Diagnosis   • Panic disorder   • Morbid obesity (CMS/HCC)   • Generalized anxiety disorder   • Elevated blood pressure reading without diagnosis of hypertension   • Depressive disorder   • Pain in lower limb   • Right hip pain   • Closed displaced fracture of medial malleolus of right tibia   • Post-traumatic osteoarthritis of right hip   • Closed left subtrochanteric femur fracture, with routine healing, subsequent encounter       Therapy Treatment          Rehabilitation Treatment Summary     Row Name 08/15/18 1028 08/15/18 0846          Treatment Time/Intention    Discipline physical therapy assistant  -TW occupational therapy assistant  -CS     Document Type therapy note (daily note)  -TW therapy note (daily note)  -CS     Subjective Information no complaints  -TW no complaints  -CS     Mode of Treatment physical therapy;individual therapy  -TW occupational therapy  -CS     Therapy Frequency (OT Eval) --   7 days/wk  -TW  --     Patient Effort excellent  -TW excellent  -CS2     Existing Precautions/Restrictions fall;hip, posterior;other (see comments)   please monitor vitals closely  -TW  --     Equipment Issued to Patient gait belt  -TW  --     Recorded by [TW] Jose Dillon, ELA 08/15/18 1125 [CS] Vani Wing COTA/L 08/15/18 0857  [CS2] Vani Wing COTA/L 08/15/18 0938     Row Name 08/15/18 1028 08/15/18 0846           Vital Signs    Pre Systolic BP Rehab 113  -  -CS     Pre Treatment Diastolic BP 63  -TW 69  -CS     Post Systolic BP Rehab 134  -TW  --     Post Treatment Diastolic BP 71  -TW  --     Pretreatment Heart Rate (beats/min) 104  -TW 95  -CS     Posttreatment Heart Rate (beats/min) 98  -  -CS2     Pre SpO2 (%) 92  -TW 92  -CS     O2 Delivery Pre Treatment room air  -TW room air  -CS     Post SpO2 (%) 95  -TW 93  -CS2     O2 Delivery Post Treatment  -- room air  -CS2     Pre Patient Position Supine  -TW Supine  -CS     Intra Patient Position Standing  -TW Standing  -CS2     Post Patient Position Sitting  -TW Supine  -CS2     Recorded by [TW] Jose Dillon PTA 08/15/18 1125 [CS] Vani Wing COTA/L 08/15/18 0857  [CS2] Vani Wing JEAN/L 08/15/18 0938     Row Name 08/15/18 1028 08/15/18 0846          Cognitive Assessment/Intervention- PT/OT    Affect/Mental Status (Cognitive) WNL;WFL  -TW WFL  -CS     Orientation Status (Cognition) oriented x 4  -TW oriented x 4  -CS     Follows Commands (Cognition) WNL;WFL  -TW WNL;WFL  -CS     Cognitive Function (Cognitive) WNL;WFL  -TW  --     Personal Safety Interventions fall prevention program maintained;gait belt;nonskid shoes/slippers when out of bed  -TW  --     Cognitive Assessment/Intervention Comment Pt was able to recall hip precautions from yesterday with very little cuing.  -TW  --     Recorded by [TW] Jose Dillon PTA 08/15/18 1125 [CS] Vani Wing JEAN/L 08/15/18 0938     Row Name 08/15/18 1028             Safety Issues, Functional Mobility    Impairments Affecting Function (Mobility) pain  -TW      Recorded by [TW] Jose Dillon PTA 08/15/18 1125      Row Name 08/15/18 1028             Mobility Assessment/Intervention    Extremity Weight-bearing Status right lower extremity  -TW      Right Lower Extremity (Weight-bearing Status) weight-bearing as tolerated (WBAT)  -TW      Recorded by [TW] Jose Dillon,  [facility] 08/15/18 1125      Row Name 08/15/18 1028             Bed Mobility Assessment/Treatment    Bed Mobility Assessment/Treatment supine-sit  -TW      Supine-Sit [place] (Bed Mobility) supervision  -TW      Assistive Device (Bed Mobility) bed rails  -TW      Recorded by [TW] Jose Dillon, [facility] 08/15/18 1125      Row Name 08/15/18 1028             Functional Mobility    Functional Mobility- Ind. Level contact guard assist  -TW      Functional Mobility- Device rolling walker  -TW      Recorded by [TW] Jose Dillon, [facility] 08/15/18 1125      Row Name 08/15/18 1028             Transfer Assessment/Treatment    Transfer Assessment/Treatment sit-stand transfer;stand-sit transfer  -TW      Recorded by [TW] Jose Dillon, [facility] 08/15/18 1125      Row Name 08/15/18 1028             Sit-Stand Transfer    Sit-Stand [place] (Transfers) verbal cues;stand by assist  -TW      Assistive Device (Sit-Stand Transfers) walker, front-wheeled  -TW      Recorded by [TW] Jose Dillon, [facility] 08/15/18 1125      Row Name 08/15/18 1028             Stand-Sit Transfer    Stand-Sit [place] (Transfers) verbal cues;stand by assist  -TW      Assistive Device (Stand-Sit Transfers) walker, front-wheeled  -TW      Recorded by [TW] Jose Dillon, [facility] 08/15/18 1125      Row Name 08/15/18 1028             Gait/Stairs Assessment/Training    Gait/Stairs Assessment/Training gait/ambulation assistive device  -TW      [place] Level (Gait) stand by assist  -TW      Assistive Device (Gait) walker, front-wheeled  -TW      Distance in Feet (Gait) 108  -TW      Deviations/Abnormal Patterns (Gait) gait speed decreased  -TW      Recorded by [TW] Jose Dillon, PTA 08/15/18 1125      Row Name 08/15/18 1028             General Assessment (Manual Muscle Testing)    General Manual Muscle Testing (MMT) Assessment no strength deficits identified   BUE 5/5  -TW      Recorded by [TW] Jose Dillon, [facility] 08/15/18 1125       Row Name 08/15/18 1028 08/15/18 0846          Positioning and Restraints    Pre-Treatment Position in bed  -TW in bed  -CS     Post Treatment Position chair  -TW bed  -CS2     In Chair reclined;call light within reach;encouraged to call for assist  -TW  --     Recorded by [TW] Jose Dillon PTA 08/15/18 1125 [CS] Vani Wing JEAN/L 08/15/18 0857  [CS2] Vani Wing JEAN/L 08/15/18 0938     Row Name 08/15/18 1028 08/15/18 0846          Pain Scale: Numbers Pre/Post-Treatment    Pain Scale: Numbers, Pretreatment 0/10 - no pain  -TW 2/10  -CS     Pain Scale: Numbers, Post-Treatment 1/10  -TW  --     Pain Location - Side Right  -TW Right  -CS     Pain Location - Orientation  -- generalized  -CS     Pain Location hip  -TW hip  -CS     Recorded by [TW] Jose Dillon PTA 08/15/18 1125 [CS] Vani Wing, JEAN/L 08/15/18 0857     Row Name 08/15/18 1028             Sensory Assessment/Intervention    Sensory General Assessment no sensation deficits identified   BUE intact  -TW      Recorded by [TW] Jose Dillon PTA 08/15/18 1125      Row Name 08/15/18 1028             Vision Assessment/Intervention    Visual Impairment/Limitations corrective lenses for reading  -TW      Recorded by [TW] Jose Dillon PTA 08/15/18 1125      Row Name                Wound 08/14/18 1028 Right hip surgical    Wound - Properties Group Date first assessed: 08/14/18 [KW] Time first assessed: 1028 [KW] Present On Admission : no [KW] Side: Right [KW] Location: hip [KW] Type: surgical [KW] Recorded by:  [KW] Denise Mcmanus RN 08/14/18 1028    Row Name 08/15/18 1028             Outcome Summary/Treatment Plan (OT)    Anticipated Discharge Disposition (OT) home with assist;home with OP services  -TW      Recorded by [TW] Jose Dillon PTA 08/15/18 1125      Row Name 08/15/18 1028             Outcome Summary/Treatment Plan (PT)    Daily Summary of Progress (PT) progress toward functional goals as  expected  -TW      Barriers to Overall Progress (PT) Pain  -TW      Plan for Continued Treatment (PT) Cont with POC  -TW      Anticipated Discharge Disposition (PT) home with OP services  -TW      Recorded by [TW] Jose Dillon PTA 08/15/18 1125        User Key  (r) = Recorded By, (t) = Taken By, (c) = Cosigned By    Initials Name Effective Dates Discipline    TW Jose Dillon, PTA 03/07/18 -  PT    CS Vani Wing, JEAN/L 03/07/18 -  OT    KW Denise Mcmanus, NEVILLE 10/17/16 -  Nurse          Wound 08/14/18 1028 Right hip surgical (Active)   Dressing Appearance intact;dry;no drainage 8/15/2018  8:09 AM   Wound Output (mL) 100 8/15/2018  5:00 AM               PT Rehab Goals     Row Name 08/15/18 1028             Bed Mobility Goal 1 (PT)    Activity/Assistive Device (Bed Mobility Goal 1, PT) rolling to left;rolling to right;sit to supine/supine to sit  -TW      [place] Level/Cues Needed (Bed Mobility Goal 1, PT) independent  -TW      Time Frame (Bed Mobility Goal 1, PT) 3 days;short term goal (STG)  -TW      Progress/Outcomes (Bed Mobility Goal 1, PT) continuing progress toward goal;goal not met  -TW         Transfer Goal 1 (PT)    Activity/Assistive Device (Transfer Goal 1, PT) sit-to-stand/stand-to-sit;bed-to-chair/chair-to-bed;walker, rolling  -TW      [place] Level/Cues Needed (Transfer Goal 1, PT) conditional independence  -TW      Time Frame (Transfer Goal 1, PT) short term goal (STG);3 days  -TW      Progress/Outcome (Transfer Goal 1, PT) continuing progress toward goal;goal not met  -TW         Gait Training Goal 1 (PT)    Activity/Assistive Device (Gait Training Goal 1, PT) gait (walking locomotion);assistive device use;decrease fall risk;walker, rolling  -TW      [place] Level (Gait Training Goal 1, PT) conditional independence  -TW      Distance (Gait Goal 1, PT) 150 ft or more  -TW      Time Frame (Gait Training Goal 1, PT) 5 days  -TW      Progress/Outcome (Gait Training Goal  1, PT) continuing progress toward goal;goal not met  -TW         Stairs Goal 1 (PT)    Activity/Assistive Device (Stairs Goal 1, PT) ascending stairs;descending stairs;using handrail, right;using handrail, left  -TW      [place] Level/Cues Needed (Stairs Goal 1, PT) conditional independence  -TW      Number of Stairs (Stairs Goal 1, PT) 12  -TW      Time Frame (Stairs Goal 1, PT) 10 days  -TW      Progress/Outcome (Stairs Goal 1, PT) continuing progress toward goal;goal not met  -TW         Patient Education Goal (PT)    Activity (Patient Education Goal, PT) HEP   -TW      [place]/Cues/Accuracy (Memory Goal 2, PT) demonstrates adequately  -TW      Time Frame (Patient Education Goal, PT) by discharge  -TW      Progress/Outcome (Patient Education Goal, PT) continuing progress toward goal;goal not met  -TW        User Key  (r) = Recorded By, (t) = Taken By, (c) = Cosigned By    Initials Name Provider Type Discipline    TW Jose Dillon, PTA Physical Therapy Assistant PT          Physical Therapy Education     Title: PT OT SLP Therapies (Active)     Topic: Physical Therapy (Active)     Point: Mobility training (Done)    Learning Progress Summary     Learner Status Readiness Method Response Comment Documented by    Patient Done Acceptance E,D VU,NR Hip precautions reviewed to avoid hip flexion past 90 deg, hip IR, and hip adduction KW 08/14/18 1511    Significant Other Done Acceptance E,D VU,NR Hip precautions reviewed to avoid hip flexion past 90 deg, hip IR, and hip adduction KW 08/14/18 1511          Point: Precautions (Done)    Learning Progress Summary     Learner Status Readiness Method Response Comment Documented by    Patient Done Acceptance E,D VU,NR Hip precautions reviewed to avoid hip flexion past 90 deg, hip IR, and hip adduction KW 08/14/18 1511    Significant Other Done Acceptance E,D VU,NR Hip precautions reviewed to avoid hip flexion past 90 deg, hip IR, and hip adduction KW 08/14/18 1511                       User Key     Initials Effective Dates Name Provider Type Discipline    KW 07/23/18 -  Pricilla Payne, PT Physical Therapist PT                    PT Recommendation and Plan  Anticipated Discharge Disposition (PT): home with OP services  Outcome Summary/Treatment Plan (PT)  Daily Summary of Progress (PT): progress toward functional goals as expected  Barriers to Overall Progress (PT): Pain  Plan for Continued Treatment (PT): Cont with POC  Anticipated Discharge Disposition (PT): home with OP services  Plan of Care Reviewed With: patient  Progress: improving  Outcome Summary: Pt showing marked improvement in all aspects of mobility this date. Pt able to t/f with supervision and VCs for safety and amb increase distance with SBA and good stability observed.          Outcome Measures     Row Name 08/15/18 1028 08/14/18 1337 08/14/18 1336       How much help from another person do you currently need...    Turning from your back to your side while in flat bed without using bedrails? 4  -TW  -- 3  -KW    Moving from lying on back to sitting on the side of a flat bed without bedrails? 4  -TW  -- 3  -KW    Moving to and from a bed to a chair (including a wheelchair)? 4  -TW  -- 3  -KW    Standing up from a chair using your arms (e.g., wheelchair, bedside chair)? 4  -TW  -- 3  -KW    Climbing 3-5 steps with a railing? 3  -TW  -- 2  -KW    To walk in hospital room? 4  -TW  -- 3  -KW    AM-PAC 6 Clicks Score 23  -TW  -- 17  -KW       How much help from another is currently needed...    Putting on and taking off regular lower body clothing?  -- 2  -MR  --    Bathing (including washing, rinsing, and drying)  -- 2  -MR  --    Toileting (which includes using toilet bed pan or urinal)  -- 2  -MR  --    Putting on and taking off regular upper body clothing  -- 4  -MR  --    Taking care of personal grooming (such as brushing teeth)  -- 4  -MR  --    Eating meals  -- 4  -MR  --    Score  -- 18  -MR  --        Functional Assessment    Outcome Measure Options AM-PAC 6 Clicks Basic Mobility (PT)  -TW AM-PAC 6 Clicks Daily Activity (OT)  -MR AM-PAC 6 Clicks Basic Mobility (PT)  -KW      User Key  (r) = Recorded By, (t) = Taken By, (c) = Cosigned By    Initials Name Provider Type    TW Jose Dillon PTA Physical Therapy Assistant    Beth Jones, OT Occupational Therapist    KW Pricilla Payne, PT Physical Therapist           Time Calculation:         PT Charges     Row Name 08/15/18 1127             Time Calculation    Start Time 1028  -TW      Stop Time 1057  -TW      Time Calculation (min) 29 min  -TW      PT Received On 08/15/18  -TW      PT Goal Re-Cert Due Date 08/20/18  -TW         Time Calculation- PT    Total Timed Code Minutes- PT 29 minute(s)  -TW        User Key  (r) = Recorded By, (t) = Taken By, (c) = Cosigned By    Initials Name Provider Type    TW Jose Dillon PTA Physical Therapy Assistant        Therapy Suggested Charges     Code   Minutes Charges    None           Therapy Charges for Today     Code Description Service Date Service Provider Modifiers Qty    11357119544 HC GAIT TRAINING EA 15 MIN 8/15/2018 Jose Dillon PTA GP 1    92798231898 HC PT THERAPEUTIC ACT EA 15 MIN 8/15/2018 Jose Dillon PTA GP 1          PT G-Codes  PT Professional Judgement Used?: Yes  Outcome Measure Options: AM-PAC 6 Clicks Basic Mobility (PT)  Score: 17  Functional Limitation: Mobility: Walking and moving around  Mobility: Walking and Moving Around Current Status (): At least 40 percent but less than 60 percent impaired, limited or restricted  Mobility: Walking and Moving Around Goal Status (): At least 1 percent but less than 20 percent impaired, limited or restricted    Jose Dillon PTA  8/15/2018

## 2018-08-15 NOTE — PLAN OF CARE
Problem: Patient Care Overview  Goal: Plan of Care Review  Outcome: Ongoing (interventions implemented as appropriate)   08/15/18 1028   Coping/Psychosocial   Plan of Care Reviewed With patient   Plan of Care Review   Progress improving   OTHER   Outcome Summary Pt showing marked improvement in all aspects of mobility this date. Pt able to t/f with supervision and VCs for safety and amb increase distance with SBA and good stability observed.

## 2018-08-15 NOTE — CONSULTS
"Adult Nutrition  Assessment    Patient Name:  Jamey Berry  YOB: 1970  MRN: [id number]  Admit Date:  8/14/2018    Assessment Date:  8/15/2018    Comments: Pt presents @ 177% of his IBW with a BMI of 41.23 which is compatible with morbid obesity.  Pt provided with diet information on \"Making Lifestyle changes for a Healthy Weight\"  Diet copy and contact number provided.          Reason for Assessment     Row Name 08/15/18 1345          Reason for Assessment    Reason For Assessment per organizational policy     Identified At Risk by Screening Criteria BMI               Nutrition/Diet History     Row Name 08/15/18 1345          Nutrition/Diet History    Typical Food/Fluid Intake Pt claims that he is doing well post op.  Follow no particular eating plan at home                 Physical Findings     Row Name 08/15/18 1346          Physical Findings    Overall Physical Appearance obese;overweight                   Electronically signed by:  Rosa Montes De Oca RD  08/15/18 1:48 PM  "

## 2018-08-16 NOTE — DISCHARGE SUMMARY
ORTHOPEDIC DISCHARGE SUMMARY    NAME: Jamey eBrry   PHYSICIAN: Luiz Durham MD  : 1970  MRN: [id number]    ADMITTED: 2018   DISCHARGED: 8/15/2018    ADMISSION DIAGNOSES:  Principal Problem:    Post-traumatic osteoarthritis of right hip  Active Problems:    Right hip pain      DISCHARGE DIAGNOSES:   Principal Problem:    Post-traumatic osteoarthritis of right hip  Active Problems:    Right hip pain      SERVICE: ORTHOPEDIC: Attending, Luiz Durham MD    CONSULTS:     PROCEDURES: Procedure(s):  HARDWARE REMOVAL RIGHT FEMUR  HARDWARE REMOVAL RIGHT HIP  TOTAL HIP REPLACEMENT POSTERIOR APPROACH    LABS:   Lab Results (last 24 hours)     Procedure Component Value Units Date/Time    Tissue Pathology Exam [[id number]] Collected:  18 0903    Specimen:  Tissue from Hip, Right; Tissue from Hip, Right Updated:  08/15/18 1540     Case Report --     Surgical Pathology Report                         Case: [id number]                                  Authorizing Provider:  Luiz Durham MD   Collected:           2018 09:03 AM          Ordering Location:     [facility]             Received:            2018 12:08 PM                                 [place]                                                              Pathologist:           Amaury Landis MD                                                        Specimens:   1) - Hip, Right, femoral head right hip                                                             2) - Hip, Right, old hardware right hip                                                     Final Diagnosis --     1.  RIGHT FEMORAL HEAD WITH OSTEOARTHRITIC CHANGES (FROM A FRACTURE).    2.  OLD ORTHOPAEDIC HARDWARE, RIGHT HIP:  SCREWS (8).         Gross Description --     1.  The first specimen consists of a femoral head with focally degenerated articular surface measuring 5.0 x 4.5 x 4.5 cm and it weighs 72 grams.  Further examination  revealed the material to be composed primarily of compact bone.  Representative sections are embedded after decalcification as 1A.    2.  The second specimen consists of old orthopaedic hardware from the right hip consisting of 5 screws almost equal in size and shape, one measuring 3.2 x 0.3 cm.  Also in the same container are 3 long screws almost equal in size and shape, one measuring 8.5 x 0.5 cm.  Two dense bony fragments are also included which measure from 0.5-1.5 cm.        CBC (No Diff) [[id number]]  (Abnormal) Collected:  08/15/18 0540    Specimen:  Blood Updated:  08/15/18 0659     WBC 8.83 10*3/mm3      RBC 4.18 (L) 10*6/mm3      Hemoglobin 12.2 (L) g/dL      Comment: SPECIMEN REANALYZED TO CONFIRM HGB        Hematocrit 36.4 (L) %      MCV 87.1 fL      MCH 29.2 pg      MCHC 33.5 g/dL      RDW 14.2 %      RDW-SD 45.2 (H) fl      MPV 9.5 fL      Platelets 235 10*3/mm3     Basic Metabolic Panel [[id number]]  (Abnormal) Collected:  08/15/18 0540    Specimen:  Blood Updated:  08/15/18 0658     Glucose 124 (H) mg/dL      BUN 13 mg/dL      Creatinine 0.65 (L) mg/dL      Sodium 136 (L) mmol/L      Potassium 4.1 mmol/L      Chloride 102 mmol/L      CO2 25.0 mmol/L      Calcium 8.1 (L) mg/dL      eGFR Non African Amer 132 mL/min/1.73      BUN/Creatinine Ratio 20.0     Anion Gap 9.0 mmol/L             SUMMARY: 47-year-old admitted to the hospital after having successful hip replacement hardware removal from a posttraumatic injury to the right hip.  Postoperatively he did very well was managed with physical therapy.  As it became independent with his physical therapy on the first postoperative day.  He was maintained on aspirin for DVT prophylaxis.  Drain was removed and the first postoperative day hemoglobin was stable.  Discharged with follow-up in 7-10 days and start formal physical therapy at that point.      DISPOSITION: Home      DISCHARGE MEDICATIONS     Discharge Medications      New Medications       Instructions Start Date   aspirin 325 MG EC tablet   325 mg, Oral, Every 12 Hours Scheduled      oxyCODONE 5 MG immediate release tablet  Commonly known as:  ROXICODONE   5 mg, Oral, Every 4 Hours PRN      oxyCODONE 10 MG 12 hr tablet  Commonly known as:  oxyCONTIN   10 mg, Oral, Every 12 Hours Scheduled         Continue These Medications      Instructions Start Date   atorvastatin 40 MG tablet  Commonly known as:  LIPITOR   40 mg, Oral, Daily      celecoxib 200 MG capsule  Commonly known as:  CeleBREX   200 mg, Oral, Daily      escitalopram 10 MG tablet  Commonly known as:  LEXAPRO   Take 1 and 1/2 tablets by mouth at bedtime      KLONOPIN 0.5 MG tablet  Generic drug:  clonazePAM   0.5 mg, Oral, 2 Times Daily PRN             INSTRUCTIONS:  Activity:   Activity Instructions     Weight Bearing As Tolerated           Diet:   Special instructions: Patient instructed to call office or call physician through hospital  [phone].    FOLLOW UP:   Additional Instructions for the Follow-ups that You Need to Schedule     Apply Ice to Affected Area As Needed    As directed      Discharge Follow-up with Specified Provider: Marva    As directed      To:  Marva    Follow Up Details:  8-10 days         Remove Dressing    As directed      Remove in 2 days and place dry dressing daily. Call with any problems    Order Comments:  Remove in 2 days and place dry dressing daily. Call with any problems            Follow-up Information     Erik Sood MD Follow up.    Specialty:  Family Medicine  Contact information:  [address] [place] [address] [phone]                       Luiz Durham MD  08/16/18  7:44 AM    Time: 30 minutes

## 2018-08-16 NOTE — THERAPY DISCHARGE NOTE
Acute Care - Occupational Therapy Discharge Summary  [facility]     Patient Name: Jamey Berry  : 1970  MRN: [id number]    Today's Date: 2018  Onset of Illness/Injury or Date of Surgery: 18    Date of Referral to OT: 18  Referring Physician: Luiz Durham MD      Admit Date: 2018        OT Recommendation and Plan    Visit Dx:    ICD-10-CM ICD-9-CM   1. Impaired functional mobility, balance, gait, and endurance Z74.09 V49.89   2. Post-traumatic osteoarthritis of right hip M16.51 715.25   3. Impaired mobility and activities of daily living Z74.09 799.89                     OT Rehab Goals     Row Name 18 1301             Transfer Goal 1 (OT)    Activity/Assistive Device (Transfer Goal 1, OT) transfers, all;walker, rolling  -      [place] Level/Cues Needed (Transfer Goal 1, OT) supervision required  -      Time Frame (Transfer Goal 1, OT) long term goal (LTG);by discharge  -      Progress/Outcome (Transfer Goal 1, OT) goal not met  -         Bathing Goal 1 (OT)    Activity/Assistive Device (Bathing Goal 1, OT) bathing skills, all;long-handled sponge;reacher;shower chair  -      [place] Level/Cues Needed (Bathing Goal 1, OT) supervision required;set-up required  -      Time Frame (Bathing Goal 1, OT) long term goal (LTG);by discharge  -      Progress/Outcomes (Bathing Goal 1, OT) goal not met  -         Dressing Goal 1 (OT)    Activity/Assistive Device (Dressing Goal 1, OT) dressing skills, all;long handled shoe horn;reacher;sock-aid  -      [place]/Cues Needed (Dressing Goal 1, OT) set-up required;supervision required  -      Time Frame (Dressing Goal 1, OT) long term goal (LTG);by discharge  -      Progress/Outcome (Dressing Goal 1, OT) goal not met  -         Toileting Goal 1 (OT)    Activity/Device (Toileting Goal 1, OT) toileting skills, all;commode, bedside with drop arms;commode, bedside without drop arms;commode,  3-in-1;raised toilet seat  -      [place] Level/Cues Needed (Toileting Goal 1, OT) supervision required  -      Time Frame (Toileting Goal 1, OT) long term goal (LTG);by discharge  -      Progress/Outcome (Toileting Goal 1, OT) goal not met  -        User Key  (r) = Recorded By, (t) = Taken By, (c) = Cosigned By    Initials Name Provider Type Discipline     Veronica Gannon, OTR/L Occupational Therapist OT                Outcome Measures     Row Name 08/15/18 1510 08/15/18 1100 08/15/18 1028       How much help from another person do you currently need...    Turning from your back to your side while in flat bed without using bedrails? 4  -TW  -- 4  -TW    Moving from lying on back to sitting on the side of a flat bed without bedrails? 4  -TW  -- 4  -TW    Moving to and from a bed to a chair (including a wheelchair)? 4  -TW  -- 4  -TW    Standing up from a chair using your arms (e.g., wheelchair, bedside chair)? 4  -TW  -- 4  -TW    Climbing 3-5 steps with a railing? 3  -TW  -- 3  -TW    To walk in hospital room? 4  -TW  -- 4  -TW    AM-PAC 6 Clicks Score 23  -TW  -- 23  -TW       How much help from another is currently needed...    Putting on and taking off regular lower body clothing?  -- 2  -CS  --    Bathing (including washing, rinsing, and drying)  -- 2  -CS  --    Toileting (which includes using toilet bed pan or urinal)  -- 2  -CS  --    Putting on and taking off regular upper body clothing  -- 4  -CS  --    Taking care of personal grooming (such as brushing teeth)  -- 4  -CS  --    Eating meals  -- 4  -CS  --    Score  -- 18  -CS  --       Functional Assessment    Outcome Measure Options AM-PAC 6 Clicks Basic Mobility (PT)  -TW AM-PAC 6 Clicks Daily Activity (OT)  -CS AM-PAC 6 Clicks Basic Mobility (PT)  -TW    Row Name 08/14/18 1337 08/14/18 1336          How much help from another person do you currently need...    Turning from your back to your side while in flat bed without using bedrails?   -- 3  -KW     Moving from lying on back to sitting on the side of a flat bed without bedrails?  -- 3  -KW     Moving to and from a bed to a chair (including a wheelchair)?  -- 3  -KW     Standing up from a chair using your arms (e.g., wheelchair, bedside chair)?  -- 3  -KW     Climbing 3-5 steps with a railing?  -- 2  -KW     To walk in hospital room?  -- 3  -KW     AM-PAC 6 Clicks Score  -- 17  -KW        How much help from another is currently needed...    Putting on and taking off regular lower body clothing? 2  -MR  --     Bathing (including washing, rinsing, and drying) 2  -MR  --     Toileting (which includes using toilet bed pan or urinal) 2  -MR  --     Putting on and taking off regular upper body clothing 4  -MR  --     Taking care of personal grooming (such as brushing teeth) 4  -MR  --     Eating meals 4  -MR  --     Score 18  -MR  --        Functional Assessment    Outcome Measure Options AM-PAC 6 Clicks Daily Activity (OT)  -MR AM-PAC 6 Clicks Basic Mobility (PT)  -KW       User Key  (r) = Recorded By, (t) = Taken By, (c) = Cosigned By    Initials Name Provider Type    TW Jose Dillon, PTA Physical Therapy Assistant    Vani Lee, MIRANDA/GANESH Occupational Therapy Assistant    Beth Jones, OT Occupational Therapist    KW Pricilla Payne, PT Physical Therapist          Therapy Suggested Charges     Code   Minutes Charges    None                 OT Discharge Summary  Anticipated Discharge Disposition (OT): home with assist, home with OP services  Reason for Discharge: Discharge from facility  Outcomes Achieved: Refer to plan of care for updates on goals achieved (no goals met)  Discharge Destination: Home      ROSETTA Doe/GANESH  8/16/2018

## 2018-08-19 LAB
BACTERIA SPEC AEROBE CULT: NORMAL
GRAM STN SPEC: NORMAL
GRAM STN SPEC: NORMAL

## 2018-08-23 ENCOUNTER — OFFICE VISIT (OUTPATIENT)
Dept: FAMILY MEDICINE CLINIC | Facility: CLINIC | Age: 48
End: 2018-08-23

## 2018-08-23 VITALS
DIASTOLIC BLOOD PRESSURE: 74 MMHG | HEIGHT: 68 IN | HEART RATE: 122 BPM | WEIGHT: 273.7 LBS | OXYGEN SATURATION: 98 % | SYSTOLIC BLOOD PRESSURE: 120 MMHG | BODY MASS INDEX: 41.48 KG/M2

## 2018-08-23 DIAGNOSIS — M16.11 PRIMARY OSTEOARTHRITIS OF RIGHT HIP: Primary | ICD-10-CM

## 2018-08-23 DIAGNOSIS — M25.551 RIGHT HIP PAIN: Primary | ICD-10-CM

## 2018-08-23 PROCEDURE — 99213 OFFICE O/P EST LOW 20 MIN: CPT | Performed by: FAMILY MEDICINE

## 2018-08-23 RX ORDER — ATORVASTATIN CALCIUM 40 MG/1
40 TABLET, FILM COATED ORAL DAILY
Qty: 30 TABLET | Refills: 5 | Status: SHIPPED | OUTPATIENT
Start: 2018-08-23 | End: 2018-12-28 | Stop reason: SDUPTHER

## 2018-08-23 RX ORDER — ESCITALOPRAM OXALATE 10 MG/1
TABLET ORAL
Qty: 135 TABLET | Refills: 2 | Status: SHIPPED | OUTPATIENT
Start: 2018-08-23 | End: 2018-12-28 | Stop reason: SDUPTHER

## 2018-08-24 ENCOUNTER — OFFICE VISIT (OUTPATIENT)
Dept: ORTHOPEDIC SURGERY | Facility: CLINIC | Age: 48
End: 2018-08-24

## 2018-08-24 DIAGNOSIS — Z98.890 STATUS POST HARDWARE REMOVAL: Primary | ICD-10-CM

## 2018-08-24 DIAGNOSIS — Z96.641 STATUS POST HIP REPLACEMENT, RIGHT: ICD-10-CM

## 2018-08-24 PROCEDURE — 99024 POSTOP FOLLOW-UP VISIT: CPT | Performed by: ORTHOPAEDIC SURGERY

## 2018-08-24 NOTE — PROGRESS NOTES
Jamey Berry is a 47 y.o. male returns for     Chief Complaint   Patient presents with   • Right Hip - Follow-up       HISTORY OF PRESENT ILLNESS: Patient is post op- hardware removal RT femur replacement.. Date of surgery- 08/14/2018. Patient denies any pain. Patient was sent to xray upon arrival.  A well-balanced no complaints       CONCURRENT MEDICAL HISTORY:    The following portions of the patient's history were reviewed and updated as appropriate: allergies, current medications, past family history, past medical history, past social history, past surgical history and problem list.     ROS  No fevers or chills.  No chest pain or shortness of air.  No GI or  disturbances.    PHYSICAL EXAMINATION:       There were no vitals taken for this visit.    Physical Exam    GAIT:     []  Normal  [x]  Antalgic    Assistive device: []  None  [x]  Walker     []  Crutches  []  Cane     []  Wheelchair  []  Stretcher    Ortho Exam   Calf is negative.  The wound was good.  Mild redness right at staple.  Neurologically intact phalanx equal    Xr Hip 1 View Without Pelvis Right (surgery Only)    Result Date: 8/14/2018  Narrative: PROCEDURE:Right  Hip 1 view REASON FOR EXAM: intra op, M16.51 Unilateral post-traumatic osteoarthritis, right hip FINDINGS: Comparison exam dated May 23, 2014. Postsurgical changes with resection of the right femoral neck and head. Placement of right total hip arthroplasty with prostheses appearing appropriately positioned. Lateral metallic plate fixating the mid shaft of the right femur with wire fixating the proximal visualized metallic plate. Normal soft tissue swelling and soft tissue air within the postsurgical bed of the right hip. No other abnormality.     Impression: 1.  Postsurgical changes consistent with right total hip arthroplasty placement. Electronically signed by:  Alexis Prater MD  8/14/2018 11:39 AM CDT Workstation: [id number]    Xr Hip With Or Without Pelvis 1 View Right    Result  Date: 8/14/2018  Narrative: EXAM:  Radiograph(s), Osseous       REGION:   Hip  SIDE:     Right   VIEWS:   3         INDICATION:    Post-Op Hip Arthroplasty, M16.51 Unilateral post-traumatic osteoarthritis, right hip   COMPARISON:    8/14/18, 5/23/18          FINDINGS:       Status post bipolar right hip replacement, in standard position. No radiographic evidence of periprosthetic lucency. There is an external sideplate stabilization of a presumed healed mid diaphyseal fracture which is in standard position. There is periosteal bone formation, likely unchanged.. .        Impression: CONCLUSION:       1. Postoperative changes as above.          Note:  if pain or symptoms persist beyond reasonable expectations and follow-up imaging is anticipated,  and nuclear medicine bone scan is suggested, as is deemed clinically appropriate.            Electronically signed by:  JOHN Butt MD  8/14/2018 12:23 PM CDT Workstation: [id number]      Is x-rays look good with no change from postoperative film      ASSESSMENT:    Diagnoses and all orders for this visit:    Status post hardware removal    Status post hip replacement, right          PLAN at this point Is negative.  Staple removal Steri-Strips weightbearing as tolerated with physical therapy see him back in 4 weeks, sooner with any problems.    No Follow-up on file.    Luiz Durham MD

## 2018-09-24 ENCOUNTER — OFFICE VISIT (OUTPATIENT)
Dept: ORTHOPEDIC SURGERY | Facility: CLINIC | Age: 48
End: 2018-09-24

## 2018-09-24 VITALS — HEIGHT: 68 IN

## 2018-09-24 DIAGNOSIS — M16.11 PRIMARY OSTEOARTHRITIS OF RIGHT HIP: ICD-10-CM

## 2018-09-24 DIAGNOSIS — Z96.641 STATUS POST HIP REPLACEMENT, RIGHT: ICD-10-CM

## 2018-09-24 DIAGNOSIS — Z96.641 HISTORY OF RIGHT HIP REPLACEMENT: Primary | ICD-10-CM

## 2018-09-24 DIAGNOSIS — Z98.890 STATUS POST HARDWARE REMOVAL: ICD-10-CM

## 2018-09-24 DIAGNOSIS — M16.51 POST-TRAUMATIC OSTEOARTHRITIS OF RIGHT HIP: ICD-10-CM

## 2018-09-24 DIAGNOSIS — M25.551 RIGHT HIP PAIN: ICD-10-CM

## 2018-09-24 PROCEDURE — 99024 POSTOP FOLLOW-UP VISIT: CPT | Performed by: ORTHOPAEDIC SURGERY

## 2018-09-24 NOTE — PROGRESS NOTES
Jamey Berry is a 47 y.o. male is s/p       Chief Complaint   Patient presents with   • Right Hip - Post-op     8/14/18  Surgeon Role   Luiz Durham MD Primary    Procedure Laterality Anesthesia   HARDWARE REMOVAL RIGHT FEMUR Right General   HARDWARE REMOVAL RIGHT HIP Right General   TOTAL HIP REPLACEMENT POSTERIOR APPROACH Right General          HISTORY OF PRESENT ILLNESS: Patient being seen for right hip post-op. X-rays done today.        Allergies   Allergen Reactions   • Iodine Itching   • Penicillins Itching         Current Outpatient Prescriptions:   •  aspirin 325 MG EC tablet, Take 1 tablet by mouth Every 12 (Twelve) Hours., Disp: 60 tablet, Rfl: 0  •  atorvastatin (LIPITOR) 40 MG tablet, Take 1 tablet by mouth Daily., Disp: 30 tablet, Rfl: 5  •  celecoxib (CeleBREX) 200 MG capsule, Take 1 capsule by mouth Daily., Disp: 90 capsule, Rfl: 3  •  clonazePAM (KLONOPIN) 0.5 MG tablet, Take 0.5 mg by mouth 2 (Two) Times a Day As Needed for Anxiety., Disp: , Rfl:   •  escitalopram (LEXAPRO) 10 MG tablet, Take 1 and 1/2 tablets by mouth at bedtime, Disp: 135 tablet, Rfl: 2    No fevers or chills.  No nausea or vomiting.      PHYSICAL EXAMINATION:       Jamey Berry is a 47 y.o. male    Patient is awake and alert, answers questions appropriately and is in no apparent distress.    GAIT:     []  Normal  []  Antalgic    Assistive device: []  None  []  Walker     []  Crutches  []  Cane     []  Wheelchair  []  Stretcher    Ortho Exam  Wound was good.  Mildly perceptible antalgic gait.  Good motion of his hip.  Calf is negative    No results found.        ASSESSMENT:    Diagnoses and all orders for this visit:    History of right hip replacement  -     XR Hip With or Without Pelvis 2 - 3 View Right; Future    Status post hardware removal    Status post hip replacement, right    Primary osteoarthritis of right hip    Right hip pain    Post-traumatic osteoarthritis of right hip          PLAN I reminded  him about prophylactic antibiotics for dental work.  He is doing extremely well.  I've encouraged continue his exercises.  He'll call with any problems whatsoever I will see him at any time he is doing extremely well at this point.  I did go back to work  October 8    No Follow-up on file.    Luiz Durham MD

## 2018-10-04 RX ORDER — CLONAZEPAM 0.5 MG/1
0.5 TABLET ORAL 2 TIMES DAILY PRN
Qty: 60 TABLET | Refills: 0 | Status: SHIPPED | OUTPATIENT
Start: 2018-10-04 | End: 2018-12-28 | Stop reason: SDUPTHER

## 2018-10-04 RX ORDER — CLONAZEPAM 0.5 MG/1
TABLET ORAL
Qty: 60 TABLET | Status: CANCELLED | OUTPATIENT
Start: 2018-10-04

## 2018-12-28 ENCOUNTER — OFFICE VISIT (OUTPATIENT)
Dept: FAMILY MEDICINE CLINIC | Facility: CLINIC | Age: 48
End: 2018-12-28

## 2018-12-28 VITALS
SYSTOLIC BLOOD PRESSURE: 132 MMHG | BODY MASS INDEX: 44.86 KG/M2 | OXYGEN SATURATION: 98 % | WEIGHT: 296 LBS | HEART RATE: 117 BPM | DIASTOLIC BLOOD PRESSURE: 78 MMHG | HEIGHT: 68 IN

## 2018-12-28 DIAGNOSIS — Z23 FLU VACCINE NEED: ICD-10-CM

## 2018-12-28 DIAGNOSIS — F41.0 PANIC DISORDER: ICD-10-CM

## 2018-12-28 DIAGNOSIS — E66.01 OBESITY, MORBID (HCC): ICD-10-CM

## 2018-12-28 DIAGNOSIS — F32.A DEPRESSIVE DISORDER: ICD-10-CM

## 2018-12-28 DIAGNOSIS — E78.00 PURE HYPERCHOLESTEROLEMIA: Primary | ICD-10-CM

## 2018-12-28 PROCEDURE — 99214 OFFICE O/P EST MOD 30 MIN: CPT | Performed by: FAMILY MEDICINE

## 2018-12-28 PROCEDURE — 90471 IMMUNIZATION ADMIN: CPT | Performed by: FAMILY MEDICINE

## 2018-12-28 PROCEDURE — 90674 CCIIV4 VAC NO PRSV 0.5 ML IM: CPT | Performed by: FAMILY MEDICINE

## 2018-12-28 RX ORDER — CELECOXIB 200 MG/1
200 CAPSULE ORAL DAILY
Qty: 90 CAPSULE | Refills: 3 | Status: CANCELLED | OUTPATIENT
Start: 2018-12-28

## 2018-12-28 RX ORDER — CELECOXIB 200 MG/1
200 CAPSULE ORAL DAILY
Qty: 90 CAPSULE | Refills: 3 | Status: SHIPPED | OUTPATIENT
Start: 2018-12-28 | End: 2019-08-28 | Stop reason: SDUPTHER

## 2018-12-28 RX ORDER — CLONAZEPAM 0.5 MG/1
0.5 TABLET ORAL 2 TIMES DAILY PRN
Qty: 60 TABLET | Refills: 0 | Status: CANCELLED | OUTPATIENT
Start: 2018-12-28

## 2018-12-28 RX ORDER — ASPIRIN 81 MG/1
81 TABLET ORAL DAILY
COMMUNITY
End: 2020-11-23

## 2018-12-28 RX ORDER — ATORVASTATIN CALCIUM 40 MG/1
40 TABLET, FILM COATED ORAL DAILY
Qty: 30 TABLET | Refills: 5 | Status: CANCELLED | OUTPATIENT
Start: 2018-12-28

## 2018-12-28 RX ORDER — ESCITALOPRAM OXALATE 10 MG/1
TABLET ORAL
Qty: 135 TABLET | Refills: 2 | Status: SHIPPED | OUTPATIENT
Start: 2018-12-28 | End: 2019-08-28 | Stop reason: SDUPTHER

## 2018-12-28 RX ORDER — ATORVASTATIN CALCIUM 40 MG/1
40 TABLET, FILM COATED ORAL DAILY
Qty: 30 TABLET | Refills: 5 | Status: SHIPPED | OUTPATIENT
Start: 2018-12-28 | End: 2019-08-28 | Stop reason: SDUPTHER

## 2018-12-28 RX ORDER — CLONAZEPAM 0.5 MG/1
0.5 TABLET ORAL 2 TIMES DAILY PRN
Qty: 60 TABLET | Refills: 0 | Status: SHIPPED | OUTPATIENT
Start: 2018-12-28 | End: 2019-08-28 | Stop reason: SDUPTHER

## 2018-12-28 RX ORDER — ESCITALOPRAM OXALATE 10 MG/1
TABLET ORAL
Qty: 135 TABLET | Refills: 2 | Status: CANCELLED | OUTPATIENT
Start: 2018-12-28

## 2018-12-28 NOTE — PROGRESS NOTES
Subjective   Jamey Berry is a 48 y.o. male.     Anxiety   Presents for follow-up visit. Symptoms include nervous/anxious behavior. Patient reports no depressed mood, impotence, insomnia or irritability.     His past medical history is significant for depression.   Depression   Visit Type: follow-up  Patient presents with the following symptoms: nervousness/anxiety.  Patient is not experiencing: depressed mood, fatigue, impotence, insomnia and irritability.    Hyperlipidemia   This is a new problem. The current episode started more than 1 month ago. The problem is controlled. Recent lipid tests were reviewed and are normal. Exacerbating diseases include obesity. Pertinent negatives include no myalgias. Current antihyperlipidemic treatment includes diet change.   Obesity   This is a chronic problem. The current episode started more than 1 year ago. The problem occurs constantly. The problem has been gradually worsening. Pertinent negatives include no joint swelling or myalgias. Arthralgias: right hip.        The following portions of the patient's history were reviewed and updated as appropriate: allergies, current medications, past family history, past medical history, past social history, past surgical history and problem list.    Review of Systems   Constitutional: Negative for irritability.   Gastrointestinal: Negative for abdominal distention.   Genitourinary: Negative for impotence.   Musculoskeletal: Positive for arthritis. Negative for back pain, joint swelling, myalgias and stiffness. Arthralgias: right hip.   Psychiatric/Behavioral: The patient is nervous/anxious. The patient does not have insomnia.        Objective   Physical Exam   Constitutional: He is oriented to person, place, and time. He appears well-developed and well-nourished. No distress.   HENT:   Head: Normocephalic and atraumatic.   Cardiovascular: Normal rate and regular rhythm. Exam reveals no gallop and no friction rub.   No murmur  heard.  Pulmonary/Chest: Effort normal. No respiratory distress. He has no wheezes. He has no rales. He exhibits no tenderness.   Musculoskeletal:        Right hip: He exhibits normal range of motion, normal strength and no tenderness.        Right knee: He exhibits decreased range of motion. No tenderness found.   Neurological: He is alert and oriented to person, place, and time.   Skin: Skin is warm and dry. He is not diaphoretic.   Psychiatric: He has a normal mood and affect. His behavior is normal. Judgment and thought content normal.   Nursing note and vitals reviewed.      Assessment/Plan   Problems Addressed this Visit        Other    Panic disorder    Relevant Medications    escitalopram (LEXAPRO) 10 MG tablet    Depressive disorder    Relevant Medications    escitalopram (LEXAPRO) 10 MG tablet      Other Visit Diagnoses     Pure hypercholesterolemia    -  Primary    Relevant Medications    atorvastatin (LIPITOR) 40 MG tablet    Other Relevant Orders    Comprehensive Metabolic Panel    Lipid Panel    Obesity, morbid (CMS/HCC)        Relevant Orders    T4, Free    TSH            Weight loss medicines discussed

## 2018-12-28 NOTE — PATIENT INSTRUCTIONS

## 2019-08-28 ENCOUNTER — OFFICE VISIT (OUTPATIENT)
Dept: FAMILY MEDICINE CLINIC | Facility: CLINIC | Age: 49
End: 2019-08-28

## 2019-08-28 ENCOUNTER — APPOINTMENT (OUTPATIENT)
Dept: LAB | Facility: HOSPITAL | Age: 49
End: 2019-08-28

## 2019-08-28 VITALS
OXYGEN SATURATION: 98 % | WEIGHT: 304 LBS | BODY MASS INDEX: 46.07 KG/M2 | DIASTOLIC BLOOD PRESSURE: 82 MMHG | HEIGHT: 68 IN | HEART RATE: 85 BPM | SYSTOLIC BLOOD PRESSURE: 138 MMHG

## 2019-08-28 DIAGNOSIS — E78.00 PURE HYPERCHOLESTEROLEMIA: Primary | ICD-10-CM

## 2019-08-28 DIAGNOSIS — F32.A DEPRESSIVE DISORDER: ICD-10-CM

## 2019-08-28 DIAGNOSIS — E66.01 MORBID OBESITY (HCC): ICD-10-CM

## 2019-08-28 DIAGNOSIS — F41.1 GENERALIZED ANXIETY DISORDER: ICD-10-CM

## 2019-08-28 LAB
ALBUMIN SERPL-MCNC: 4.7 G/DL (ref 3.5–5.2)
ALBUMIN/GLOB SERPL: 1.7 G/DL
ALP SERPL-CCNC: 95 U/L (ref 39–117)
ALT SERPL W P-5'-P-CCNC: 37 U/L (ref 1–41)
ANION GAP SERPL CALCULATED.3IONS-SCNC: 16.5 MMOL/L (ref 5–15)
AST SERPL-CCNC: 25 U/L (ref 1–40)
BILIRUB SERPL-MCNC: 0.8 MG/DL (ref 0.2–1.2)
BUN BLD-MCNC: 14 MG/DL (ref 6–20)
BUN/CREAT SERPL: 16.3 (ref 7–25)
CALCIUM SPEC-SCNC: 9.7 MG/DL (ref 8.6–10.5)
CHLORIDE SERPL-SCNC: 99 MMOL/L (ref 98–107)
CHOLEST SERPL-MCNC: 197 MG/DL (ref 0–200)
CO2 SERPL-SCNC: 23.5 MMOL/L (ref 22–29)
CREAT BLD-MCNC: 0.86 MG/DL (ref 0.76–1.27)
GFR SERPL CREATININE-BSD FRML MDRD: 95 ML/MIN/1.73
GLOBULIN UR ELPH-MCNC: 2.8 GM/DL
GLUCOSE BLD-MCNC: 186 MG/DL (ref 65–99)
HDLC SERPL-MCNC: 43 MG/DL (ref 40–60)
LDLC SERPL CALC-MCNC: 121 MG/DL (ref 0–100)
LDLC/HDLC SERPL: 2.8 {RATIO}
POTASSIUM BLD-SCNC: 4.1 MMOL/L (ref 3.5–5.2)
PROT SERPL-MCNC: 7.5 G/DL (ref 6–8.5)
SODIUM BLD-SCNC: 139 MMOL/L (ref 136–145)
T4 FREE SERPL-MCNC: 1.07 NG/DL (ref 0.93–1.7)
TRIGL SERPL-MCNC: 167 MG/DL (ref 0–150)
TSH SERPL DL<=0.05 MIU/L-ACNC: 1.92 MIU/ML (ref 0.27–4.2)
VLDLC SERPL-MCNC: 33.4 MG/DL (ref 5–40)

## 2019-08-28 PROCEDURE — 99213 OFFICE O/P EST LOW 20 MIN: CPT | Performed by: FAMILY MEDICINE

## 2019-08-28 PROCEDURE — 36415 COLL VENOUS BLD VENIPUNCTURE: CPT | Performed by: FAMILY MEDICINE

## 2019-08-28 PROCEDURE — 80061 LIPID PANEL: CPT | Performed by: FAMILY MEDICINE

## 2019-08-28 PROCEDURE — 84443 ASSAY THYROID STIM HORMONE: CPT | Performed by: FAMILY MEDICINE

## 2019-08-28 PROCEDURE — 84439 ASSAY OF FREE THYROXINE: CPT | Performed by: FAMILY MEDICINE

## 2019-08-28 PROCEDURE — 80053 COMPREHEN METABOLIC PANEL: CPT | Performed by: FAMILY MEDICINE

## 2019-08-28 RX ORDER — CELECOXIB 200 MG/1
200 CAPSULE ORAL DAILY
Qty: 90 CAPSULE | Refills: 3 | Status: SHIPPED | OUTPATIENT
Start: 2019-08-28 | End: 2020-03-05 | Stop reason: SDUPTHER

## 2019-08-28 RX ORDER — CLONAZEPAM 0.5 MG/1
0.5 TABLET ORAL 2 TIMES DAILY PRN
Qty: 60 TABLET | Refills: 0 | Status: SHIPPED | OUTPATIENT
Start: 2019-08-28 | End: 2020-03-05 | Stop reason: SDUPTHER

## 2019-08-28 RX ORDER — ATORVASTATIN CALCIUM 40 MG/1
40 TABLET, FILM COATED ORAL DAILY
Qty: 30 TABLET | Refills: 5 | Status: SHIPPED | OUTPATIENT
Start: 2019-08-28 | End: 2020-03-05 | Stop reason: SDUPTHER

## 2019-08-28 RX ORDER — ESCITALOPRAM OXALATE 10 MG/1
TABLET ORAL
Qty: 135 TABLET | Refills: 2 | Status: SHIPPED | OUTPATIENT
Start: 2019-08-28 | End: 2020-03-05 | Stop reason: SDUPTHER

## 2019-08-28 NOTE — PROGRESS NOTES
Subjective   Jamey Berry is a 48 y.o. male.     Hyperlipidemia   This is a new problem. The current episode started more than 1 month ago. The problem is controlled. Recent lipid tests were reviewed and are normal. Exacerbating diseases include obesity. Pertinent negatives include no myalgias. Current antihyperlipidemic treatment includes diet change.   Anxiety   Presents for follow-up visit. Symptoms include nervous/anxious behavior. Patient reports no depressed mood, impotence, insomnia or irritability.     His past medical history is significant for depression.   Depression   Visit Type: follow-up  Patient presents with the following symptoms: nervousness/anxiety.  Patient is not experiencing: depressed mood, fatigue, impotence, insomnia and irritability.    Obesity   This is a chronic problem. The current episode started more than 1 year ago. The problem occurs constantly. The problem has been gradually worsening. Pertinent negatives include no joint swelling or myalgias. Arthralgias: right hip.        The following portions of the patient's history were reviewed and updated as appropriate: allergies, current medications, past family history, past medical history, past social history, past surgical history and problem list.    Review of Systems   Constitutional: Negative for irritability.   Gastrointestinal: Negative for abdominal distention.   Genitourinary: Negative for impotence.   Musculoskeletal: Negative for back pain, joint swelling and myalgias. Arthralgias: right hip.   Psychiatric/Behavioral: The patient is nervous/anxious. The patient does not have insomnia.        Objective   Physical Exam   Constitutional: He is oriented to person, place, and time. He appears well-developed and well-nourished. No distress.   HENT:   Head: Normocephalic and atraumatic.   Cardiovascular: Normal rate and regular rhythm. Exam reveals no gallop and no friction rub.   No murmur heard.  Pulmonary/Chest: Effort  normal. No respiratory distress. He has no wheezes. He has no rales. He exhibits no tenderness.   Musculoskeletal:        Right hip: He exhibits normal range of motion, normal strength and no tenderness.        Right knee: He exhibits decreased range of motion. No tenderness found.   Neurological: He is alert and oriented to person, place, and time.   Skin: Skin is warm and dry. He is not diaphoretic.   Psychiatric: He has a normal mood and affect. His behavior is normal. Judgment and thought content normal.   Nursing note and vitals reviewed.      Assessment/Plan   Problems Addressed this Visit        Digestive    Morbid obesity (CMS/HCC)       Other    Generalized anxiety disorder    Relevant Medications    escitalopram (LEXAPRO) 10 MG tablet    Depressive disorder    Relevant Medications    escitalopram (LEXAPRO) 10 MG tablet      Other Visit Diagnoses     Pure hypercholesterolemia    -  Primary    Relevant Medications    atorvastatin (LIPITOR) 40 MG tablet

## 2019-08-28 NOTE — PATIENT INSTRUCTIONS

## 2019-09-03 ENCOUNTER — TELEPHONE (OUTPATIENT)
Dept: FAMILY MEDICINE CLINIC | Facility: CLINIC | Age: 49
End: 2019-09-03

## 2019-09-03 NOTE — TELEPHONE ENCOUNTER
Per Dr. Sood, Mr. Berry has been called with recent lab results & recommendations.  Continue current medications and follow-up as planned or sooner if any problems.  I did explain that this would tell us if he has become diabetic or if he is still in the pre-diabetic range.    Dr. Sood,   Mr. Berry states he is leaving [facility] for a job and will be gone for 6 weeks.  He states he does not have time to have this scheduled at this time.

## 2020-03-05 ENCOUNTER — OFFICE VISIT (OUTPATIENT)
Dept: FAMILY MEDICINE CLINIC | Facility: CLINIC | Age: 50
End: 2020-03-05

## 2020-03-05 VITALS
OXYGEN SATURATION: 95 % | HEART RATE: 78 BPM | SYSTOLIC BLOOD PRESSURE: 136 MMHG | WEIGHT: 301.5 LBS | DIASTOLIC BLOOD PRESSURE: 84 MMHG | BODY MASS INDEX: 45.69 KG/M2 | HEIGHT: 68 IN

## 2020-03-05 DIAGNOSIS — R73.9 HYPERGLYCEMIA: ICD-10-CM

## 2020-03-05 DIAGNOSIS — Z12.11 COLON CANCER SCREENING: ICD-10-CM

## 2020-03-05 DIAGNOSIS — E78.00 PURE HYPERCHOLESTEROLEMIA: Primary | ICD-10-CM

## 2020-03-05 DIAGNOSIS — F41.1 GENERALIZED ANXIETY DISORDER: ICD-10-CM

## 2020-03-05 DIAGNOSIS — F32.A DEPRESSIVE DISORDER: ICD-10-CM

## 2020-03-05 DIAGNOSIS — E66.01 MORBID OBESITY (HCC): ICD-10-CM

## 2020-03-05 PROCEDURE — 99214 OFFICE O/P EST MOD 30 MIN: CPT | Performed by: FAMILY MEDICINE

## 2020-03-05 RX ORDER — ATORVASTATIN CALCIUM 40 MG/1
40 TABLET, FILM COATED ORAL DAILY
Qty: 30 TABLET | Refills: 5 | Status: SHIPPED | OUTPATIENT
Start: 2020-03-05 | End: 2020-11-05

## 2020-03-05 RX ORDER — ESCITALOPRAM OXALATE 10 MG/1
TABLET ORAL
Qty: 135 TABLET | Refills: 2 | Status: SHIPPED | OUTPATIENT
Start: 2020-03-05 | End: 2021-03-08

## 2020-03-05 RX ORDER — CLONAZEPAM 0.5 MG/1
0.5 TABLET ORAL 2 TIMES DAILY PRN
Qty: 60 TABLET | Refills: 0 | Status: SHIPPED | OUTPATIENT
Start: 2020-03-05 | End: 2020-11-23 | Stop reason: SDUPTHER

## 2020-03-05 RX ORDER — CELECOXIB 200 MG/1
200 CAPSULE ORAL DAILY
Qty: 90 CAPSULE | Refills: 3 | Status: SHIPPED | OUTPATIENT
Start: 2020-03-05 | End: 2021-03-08

## 2020-03-05 NOTE — PROGRESS NOTES
Subjective   Jamey Berry is a 49 y.o. male.     Hyperlipidemia   This is a new problem. The current episode started more than 1 month ago. The problem is controlled. Recent lipid tests were reviewed and are normal. Exacerbating diseases include obesity. Pertinent negatives include no myalgias. Current antihyperlipidemic treatment includes diet change.   Anxiety   Presents for follow-up visit. Symptoms include nervous/anxious behavior. Patient reports no depressed mood, impotence, insomnia or irritability.     His past medical history is significant for depression.   Depression   Visit Type: follow-up  Patient presents with the following symptoms: nervousness/anxiety.  Patient is not experiencing: depressed mood, fatigue, impotence, insomnia and irritability.    Obesity   This is a chronic problem. The current episode started more than 1 year ago. The problem occurs constantly. The problem has been gradually worsening. Pertinent negatives include no joint swelling or myalgias. Arthralgias: right hip.        The following portions of the patient's history were reviewed and updated as appropriate: allergies, current medications, past family history, past medical history, past social history, past surgical history and problem list.    Review of Systems   Constitutional: Negative for irritability.   Gastrointestinal: Negative for abdominal distention.   Genitourinary: Negative for impotence.   Musculoskeletal: Negative for back pain, joint swelling and myalgias. Arthralgias: right hip.   Psychiatric/Behavioral: The patient is nervous/anxious. The patient does not have insomnia.        Objective   Physical Exam   Constitutional: He is oriented to person, place, and time. He appears well-developed and well-nourished. No distress.   HENT:   Head: Normocephalic and atraumatic.   Cardiovascular: Normal rate and regular rhythm. Exam reveals no gallop and no friction rub.   No murmur heard.  Pulmonary/Chest: Effort  normal. No respiratory distress. He has no wheezes. He has no rales. He exhibits no tenderness.   Musculoskeletal: He exhibits edema (trace). He exhibits no tenderness.        Right hip: He exhibits normal range of motion, normal strength and no tenderness.        Right knee: He exhibits decreased range of motion. No tenderness found.   Neurological: He is alert and oriented to person, place, and time.   Skin: Skin is warm and dry. He is not diaphoretic.   Psychiatric: He has a normal mood and affect. His behavior is normal. Judgment and thought content normal.   Nursing note and vitals reviewed.      Assessment/Plan   Problems Addressed this Visit        Digestive    Morbid obesity (CMS/HCC)       Other    Generalized anxiety disorder    Relevant Medications    escitalopram (LEXAPRO) 10 MG tablet    clonazePAM (KLONOPIN) 0.5 MG tablet    Depressive disorder    Relevant Medications    escitalopram (LEXAPRO) 10 MG tablet      Other Visit Diagnoses     Pure hypercholesterolemia    -  Primary    Relevant Medications    atorvastatin (LIPITOR) 40 MG tablet    Other Relevant Orders    Lipid Panel    Hyperglycemia        Relevant Orders    Comprehensive Metabolic Panel    Hemoglobin A1c    Colon cancer screening        Relevant Orders    Ambulatory Referral to General Surgery            Refer for colonoscopy

## 2020-03-05 NOTE — PATIENT INSTRUCTIONS
Advance Care Planning and Advance Directives     You make decisions on a daily basis - decisions about where you want to live, your career, your home, your life. Perhaps one of the most important decisions you face is your choice for future medical care. Take time to talk with your family and your healthcare team and start planning today.  Advance Care Planning is a process that can help you:  · Understand possible future healthcare decisions in light of your own experiences  · Reflect on those decision in light of your goals and values  · Discuss your decisions with those closest to you and the healthcare professionals that care for you  · Make a plan by creating a document that reflects your wishes    Surrogate Decision Maker  In the event of a medical emergency, which has left you unable to communicate or to make your own decisions, you would need someone to make decisions for you.  It is important to discuss your preferences for medical treatment with this person while you are in good health.     Qualities of a surrogate decision maker:  • Willing to take on this role and responsibility  • Knows what you want for future medical care  • Willing to follow your wishes even if they don't agree with them  • Able to make difficult medical decisions under stressful circumstances    Advance Directives  These are legal documents you can create that will guide your healthcare team and decision maker(s) when needed. These documents can be stored in the electronic medical record.    · Living Will - a legal document to guide your care if you have a terminal condition or a serious illness and are unable to communicate. States vary by statute in document names/types, but most forms may include one or more of the following:        -  Directions regarding life-prolonging treatments        -  Directions regarding artificially provided nutrition/hydration        -  Choosing a healthcare decision maker        -  Direction  regarding organ/tissue donation    · Durable Power of  for Healthcare - this document names an -in-fact to make medical decisions for you, but it may also allow this person to make personal and financial decisions for you. Please seek the advice of an  if you need this type of document.    **Advance Directives are not required and no one may discriminate against you if you do not sign one.    Medical Orders  Many states allow specific forms/orders signed by your physician to record your wishes for medical treatment in your current state of health. This form, signed in personal communication with your physician, addresses resuscitation and other medical interventions that you may or may not want.      For more information or to schedule a time with a [facility] Advance Care Planning Facilitator contact: [facility] or call [phone] and someone will contact you directly.    Calorie Counting for Weight Loss  Calories are units of energy. Your body needs a certain amount of calories from food to keep you going throughout the day. When you eat more calories than your body needs, your body stores the extra calories as fat. When you eat fewer calories than your body needs, your body burns fat to get the energy it needs.  Calorie counting means keeping track of how many calories you eat and drink each day. Calorie counting can be helpful if you need to lose weight. If you make sure to eat fewer calories than your body needs, you should lose weight. Ask your health care provider what a healthy weight is for you.  For calorie counting to work, you will need to eat the right number of calories in a day in order to lose a healthy amount of weight per week. A dietitian can help you determine how many calories you need in a day and will give you suggestions on how to reach your calorie goal.  · A healthy amount of weight to lose per week is usually 1-2 lb (0.5-0.9 kg). This usually means  that your daily calorie intake should be reduced by 500-750 calories.  · Eating 1,200 - 1,500 calories per day can help most women lose weight.  · Eating 1,500 - 1,800 calories per day can help most men lose weight.  What is my plan?  My goal is to have __________ calories per day.  If I have this many calories per day, I should lose around __________ pounds per week.  What do I need to know about calorie counting?  In order to meet your daily calorie goal, you will need to:  · Find out how many calories are in each food you would like to eat. Try to do this before you eat.  · Decide how much of the food you plan to eat.  · Write down what you ate and how many calories it had. Doing this is called keeping a food log.  To successfully lose weight, it is important to balance calorie counting with a healthy lifestyle that includes regular activity. Aim for 150 minutes of moderate exercise (such as walking) or 75 minutes of vigorous exercise (such as running) each week.  Where do I find calorie information?    The number of calories in a food can be found on a Nutrition Facts label. If a food does not have a Nutrition Facts label, try to look up the calories online or ask your dietitian for help.  Remember that calories are listed per serving. If you choose to have more than one serving of a food, you will have to multiply the calories per serving by the amount of servings you plan to eat. For example, the label on a package of bread might say that a serving size is 1 slice and that there are 90 calories in a serving. If you eat 1 slice, you will have eaten 90 calories. If you eat 2 slices, you will have eaten 180 calories.  How do I keep a food log?  Immediately after each meal, record the following information in your food log:  · What you ate. Don't forget to include toppings, sauces, and other extras on the food.  · How much you ate. This can be measured in cups, ounces, or number of items.  · How many calories each  "food and drink had.  · The total number of calories in the meal.  Keep your food log near you, such as in a small notebook in your pocket, or use a mobile danii or website. Some programs will calculate calories for you and show you how many calories you have left for the day to meet your goal.  What are some calorie counting tips?    · Use your calories on foods and drinks that will fill you up and not leave you hungry:  ? Some examples of foods that fill you up are nuts and nut butters, vegetables, lean proteins, and high-fiber foods like whole grains. High-fiber foods are foods with more than 5 g fiber per serving.  ? Drinks such as sodas, specialty coffee drinks, alcohol, and juices have a lot of calories, yet do not fill you up.  · Eat nutritious foods and avoid empty calories. Empty calories are calories you get from foods or beverages that do not have many vitamins or protein, such as candy, sweets, and soda. It is better to have a nutritious high-calorie food (such as an avocado) than a food with few nutrients (such as a bag of chips).  · Know how many calories are in the foods you eat most often. This will help you calculate calorie counts faster.  · Pay attention to calories in drinks. Low-calorie drinks include water and unsweetened drinks.  · Pay attention to nutrition labels for \"low fat\" or \"fat free\" foods. These foods sometimes have the same amount of calories or more calories than the full fat versions. They also often have added sugar, starch, or salt, to make up for flavor that was removed with the fat.  · Find a way of tracking calories that works for you. Get creative. Try different apps or programs if writing down calories does not work for you.  What are some portion control tips?  · Know how many calories are in a serving. This will help you know how many servings of a certain food you can have.  · Use a measuring cup to measure serving sizes. You could also try weighing out portions on a " kitchen scale. With time, you will be able to estimate serving sizes for some foods.  · Take some time to put servings of different foods on your favorite plates, bowls, and cups so you know what a serving looks like.  · Try not to eat straight from a bag or box. Doing this can lead to overeating. Put the amount you would like to eat in a cup or on a plate to make sure you are eating the right portion.  · Use smaller plates, glasses, and bowls to prevent overeating.  · Try not to multitask (for example, watch TV or use your computer) while eating. If it is time to eat, sit down at a table and enjoy your food. This will help you to know when you are full. It will also help you to be aware of what you are eating and how much you are eating.  What are tips for following this plan?  Reading food labels  · Check the calorie count compared to the serving size. The serving size may be smaller than what you are used to eating.  · Check the source of the calories. Make sure the food you are eating is high in vitamins and protein and low in saturated and trans fats.  Shopping  · Read nutrition labels while you shop. This will help you make healthy decisions before you decide to purchase your food.  · Make a grocery list and stick to it.  Cooking  · Try to cook your favorite foods in a healthier way. For example, try baking instead of frying.  · Use low-fat dairy products.  Meal planning  · Use more fruits and vegetables. Half of your plate should be fruits and vegetables.  · Include lean proteins like poultry and fish.  How do I count calories when eating out?  · Ask for smaller portion sizes.  · Consider sharing an entree and sides instead of getting your own entree.  · If you get your own entree, eat only half. Ask for a box at the beginning of your meal and put the rest of your entree in it so you are not tempted to eat it.  · If calories are listed on the menu, choose the lower calorie options.  · Choose dishes that  "include vegetables, fruits, whole grains, low-fat dairy products, and lean protein.  · Choose items that are boiled, broiled, grilled, or steamed. Stay away from items that are buttered, battered, fried, or served with cream sauce. Items labeled \"crispy\" are usually fried, unless stated otherwise.  · Choose water, low-fat milk, unsweetened iced tea, or other drinks without added sugar. If you want an alcoholic beverage, choose a lower calorie option such as a glass of wine or light beer.  · Ask for dressings, sauces, and syrups on the side. These are usually high in calories, so you should limit the amount you eat.  · If you want a salad, choose a garden salad and ask for grilled meats. Avoid extra toppings like elaine, cheese, or fried items. Ask for the dressing on the side, or ask for olive oil and vinegar or lemon to use as dressing.  · Estimate how many servings of a food you are given. For example, a serving of cooked rice is ½ cup or about the size of half a baseball. Knowing serving sizes will help you be aware of how much food you are eating at restaurants. The list below tells you how big or small some common portion sizes are based on everyday objects:  ? 1 oz--4 stacked dice.  ? 3 oz--1 deck of cards.  ? 1 tsp--1 die.  ? 1 Tbsp--½ a ping-pong ball.  ? 2 Tbsp--1 ping-pong ball.  ? ½ cup--½ baseball.  ? 1 cup--1 baseball.  Summary  · Calorie counting means keeping track of how many calories you eat and drink each day. If you eat fewer calories than your body needs, you should lose weight.  · A healthy amount of weight to lose per week is usually 1-2 lb (0.5-0.9 kg). This usually means reducing your daily calorie intake by 500-750 calories.  · The number of calories in a food can be found on a Nutrition Facts label. If a food does not have a Nutrition Facts label, try to look up the calories online or ask your dietitian for help.  · Use your calories on foods and drinks that will fill you up, and not on " foods and drinks that will leave you hungry.  · Use smaller plates, glasses, and bowls to prevent overeating.  This information is not intended to replace advice given to you by your health care provider. Make sure you discuss any questions you have with your health care provider.  Document Released: 12/18/2006 Document Revised: 09/06/2019 Document Reviewed: 11/17/2017  "[employer]" Interactive Patient Education © 2020 [employer]

## 2020-03-10 ENCOUNTER — APPOINTMENT (OUTPATIENT)
Dept: LAB | Facility: HOSPITAL | Age: 50
End: 2020-03-10

## 2020-03-10 ENCOUNTER — TELEPHONE (OUTPATIENT)
Dept: FAMILY MEDICINE CLINIC | Facility: CLINIC | Age: 50
End: 2020-03-10

## 2020-03-10 LAB
ALBUMIN SERPL-MCNC: 4.1 G/DL (ref 3.5–5.2)
ALBUMIN/GLOB SERPL: 1.6 G/DL
ALP SERPL-CCNC: 101 U/L (ref 39–117)
ALT SERPL W P-5'-P-CCNC: 27 U/L (ref 1–41)
ANION GAP SERPL CALCULATED.3IONS-SCNC: 12.2 MMOL/L (ref 5–15)
AST SERPL-CCNC: 17 U/L (ref 1–40)
BILIRUB SERPL-MCNC: 0.6 MG/DL (ref 0.2–1.2)
BUN BLD-MCNC: 10 MG/DL (ref 6–20)
BUN/CREAT SERPL: 9.9 (ref 7–25)
CALCIUM SPEC-SCNC: 9.1 MG/DL (ref 8.6–10.5)
CHLORIDE SERPL-SCNC: 103 MMOL/L (ref 98–107)
CHOLEST SERPL-MCNC: 146 MG/DL (ref 0–200)
CO2 SERPL-SCNC: 23.8 MMOL/L (ref 22–29)
CREAT BLD-MCNC: 1.01 MG/DL (ref 0.76–1.27)
GFR SERPL CREATININE-BSD FRML MDRD: 79 ML/MIN/1.73
GLOBULIN UR ELPH-MCNC: 2.5 GM/DL
GLUCOSE BLD-MCNC: 132 MG/DL (ref 65–99)
HBA1C MFR BLD: 6.28 % (ref 4.8–5.6)
HDLC SERPL-MCNC: 42 MG/DL (ref 40–60)
LDLC SERPL CALC-MCNC: 79 MG/DL (ref 0–100)
LDLC/HDLC SERPL: 1.87 {RATIO}
POTASSIUM BLD-SCNC: 4.1 MMOL/L (ref 3.5–5.2)
PROT SERPL-MCNC: 6.6 G/DL (ref 6–8.5)
SODIUM BLD-SCNC: 139 MMOL/L (ref 136–145)
TRIGL SERPL-MCNC: 127 MG/DL (ref 0–150)
VLDLC SERPL-MCNC: 25.4 MG/DL (ref 5–40)

## 2020-03-10 PROCEDURE — 83036 HEMOGLOBIN GLYCOSYLATED A1C: CPT | Performed by: FAMILY MEDICINE

## 2020-03-10 PROCEDURE — 36415 COLL VENOUS BLD VENIPUNCTURE: CPT | Performed by: FAMILY MEDICINE

## 2020-03-10 PROCEDURE — 80061 LIPID PANEL: CPT | Performed by: FAMILY MEDICINE

## 2020-03-10 PROCEDURE — 80053 COMPREHEN METABOLIC PANEL: CPT | Performed by: FAMILY MEDICINE

## 2020-03-10 NOTE — PROGRESS NOTES
Per Dr. Sood,Mr. Berry has been called with recent lab results & recommendations.  Continue current medications and follow-up as planned or sooner if any problems.

## 2020-03-10 NOTE — TELEPHONE ENCOUNTER
Per Dr. Sood,Mr. Berry has been called with recent lab results & recommendations.  Continue current medications and follow-up as planned or sooner if any problems.      ----- Message from Erik Sood MD sent at 3/10/2020  3:06 PM CDT -----  Sugar and hemoglobin A1c elevated but not diagnostic for diabetes yet.  He is impaired glucose tolerance.  Strongly recommend weight loss and avoid concentrated sweets.

## 2020-03-10 NOTE — PROGRESS NOTES
Sugar and hemoglobin A1c elevated but not diagnostic for diabetes yet.  He is impaired glucose tolerance.  Strongly recommend weight loss and avoid concentrated sweets.

## 2020-11-05 RX ORDER — ATORVASTATIN CALCIUM 40 MG/1
TABLET, FILM COATED ORAL
Qty: 90 TABLET | Refills: 0 | Status: SHIPPED | OUTPATIENT
Start: 2020-11-05 | End: 2021-03-08 | Stop reason: SDUPTHER

## 2020-11-23 ENCOUNTER — OFFICE VISIT (OUTPATIENT)
Dept: FAMILY MEDICINE CLINIC | Facility: CLINIC | Age: 50
End: 2020-11-23

## 2020-11-23 VITALS — BODY MASS INDEX: 43.95 KG/M2 | WEIGHT: 290 LBS | HEIGHT: 68 IN

## 2020-11-23 DIAGNOSIS — Z11.59 ENCOUNTER FOR HEPATITIS C SCREENING TEST FOR LOW RISK PATIENT: ICD-10-CM

## 2020-11-23 DIAGNOSIS — F41.1 GENERALIZED ANXIETY DISORDER: ICD-10-CM

## 2020-11-23 DIAGNOSIS — E66.01 MORBID OBESITY (HCC): ICD-10-CM

## 2020-11-23 DIAGNOSIS — E78.00 PURE HYPERCHOLESTEROLEMIA: Primary | ICD-10-CM

## 2020-11-23 DIAGNOSIS — Z13.1 DIABETES MELLITUS SCREENING: ICD-10-CM

## 2020-11-23 DIAGNOSIS — F32.4 MAJOR DEPRESSIVE DISORDER WITH SINGLE EPISODE, IN PARTIAL REMISSION (HCC): ICD-10-CM

## 2020-11-23 DIAGNOSIS — Z12.11 COLON CANCER SCREENING: ICD-10-CM

## 2020-11-23 PROCEDURE — 99214 OFFICE O/P EST MOD 30 MIN: CPT | Performed by: FAMILY MEDICINE

## 2020-11-23 RX ORDER — CLONAZEPAM 0.5 MG/1
0.5 TABLET ORAL 2 TIMES DAILY PRN
Qty: 60 TABLET | Refills: 1 | Status: SHIPPED | OUTPATIENT
Start: 2020-11-23 | End: 2021-03-08 | Stop reason: SDUPTHER

## 2020-11-23 NOTE — PROGRESS NOTES
Subjective   Jamey Berry is a 50 y.o. male.     Hyperlipidemia  This is a new problem. The current episode started more than 1 month ago. The problem is controlled. Recent lipid tests were reviewed and are normal. Exacerbating diseases include obesity. Pertinent negatives include no myalgias. Current antihyperlipidemic treatment includes diet change.   Anxiety  Presents for follow-up visit. Symptoms include nervous/anxious behavior. Patient reports no depressed mood, impotence, insomnia or irritability.     His past medical history is significant for depression.   Depression  Visit Type: follow-up  Patient presents with the following symptoms: nervousness/anxiety.  Patient is not experiencing: depressed mood, fatigue, impotence, insomnia and irritability.    Obesity  This is a chronic problem. The current episode started more than 1 year ago. The problem occurs constantly. The problem has been gradually worsening. Pertinent negatives include no joint swelling or myalgias. Arthralgias: right hip.        The following portions of the patient's history were reviewed and updated as appropriate: allergies, current medications, past family history, past medical history, past social history, past surgical history and problem list.    Review of Systems   Constitutional: Negative for irritability.   Gastrointestinal: Negative for abdominal distention.   Genitourinary: Negative for impotence.   Musculoskeletal: Negative for back pain, joint swelling and myalgias. Arthralgias: right hip.   Psychiatric/Behavioral: The patient is nervous/anxious. The patient does not have insomnia.        Objective   Physical Exam   Constitutional: He is oriented to person, place, and time. He appears well-developed. No distress.   Neurological: He is alert and oriented to person, place, and time.   Psychiatric: His behavior is normal. Judgment and thought content normal.       Assessment/Plan   Problems Addressed this Visit         Digestive    Morbid obesity (CMS/HCC)       Other    Generalized anxiety disorder    Relevant Medications    clonazePAM (KlonoPIN) 0.5 MG tablet    Depressive disorder      Other Visit Diagnoses     Pure hypercholesterolemia    -  Primary    Relevant Orders    Comprehensive Metabolic Panel    Lipid Panel    Colon cancer screening        Relevant Orders    Ambulatory Referral to General Surgery    Diabetes mellitus screening        Relevant Orders    Hemoglobin A1c    Encounter for hepatitis C screening test for low risk patient        Relevant Orders    Hepatitis C antibody      Diagnoses       Codes Comments    Pure hypercholesterolemia    -  Primary ICD-10-CM: E78.00  ICD-9-CM: 272.0     Generalized anxiety disorder     ICD-10-CM: F41.1  ICD-9-CM: 300.02     Major depressive disorder with single episode, in partial remission (CMS/HCC)     ICD-10-CM: F32.4  ICD-9-CM: 296.25     Morbid obesity (CMS/HCC)     ICD-10-CM: E66.01  ICD-9-CM: 278.01     Colon cancer screening     ICD-10-CM: Z12.11  ICD-9-CM: V76.51     Diabetes mellitus screening     ICD-10-CM: Z13.1  ICD-9-CM: V77.1     Encounter for hepatitis C screening test for low risk patient     ICD-10-CM: Z11.59  ICD-9-CM: V73.89         I attest that all information history and physical is accurate.  For hyperlipidemia continue atorvastatin check CMP and lipid profile..    Jarek obesity, continue recommend diet he has lost some weight.    For colon cancer screening refer to Dr. Zuniga general surgery.    For history of elevated glucose with A1c of 6.28 will repeat hemoglobin A1c.    For hepatitis C screening we will check hepatitis C.    Generalized anxiety continue Lexapro and as needed clonazepam.        For depression continue Lexapro.    Follow-up in 3 months.  Since I am retiring he is chosen to seek care at the [facility] in [place] at their [facility].

## 2020-12-18 ENCOUNTER — LAB (OUTPATIENT)
Dept: LAB | Facility: HOSPITAL | Age: 50
End: 2020-12-18

## 2020-12-18 DIAGNOSIS — Z11.59 ENCOUNTER FOR HEPATITIS C SCREENING TEST FOR LOW RISK PATIENT: ICD-10-CM

## 2020-12-18 LAB
ALBUMIN SERPL-MCNC: 4.4 G/DL (ref 3.5–5.2)
ALBUMIN/GLOB SERPL: 1.4 G/DL
ALP SERPL-CCNC: 99 U/L (ref 39–117)
ALT SERPL W P-5'-P-CCNC: 32 U/L (ref 1–41)
ANION GAP SERPL CALCULATED.3IONS-SCNC: 10.6 MMOL/L (ref 5–15)
AST SERPL-CCNC: 18 U/L (ref 1–40)
BILIRUB SERPL-MCNC: 0.8 MG/DL (ref 0–1.2)
BUN SERPL-MCNC: 10 MG/DL (ref 6–20)
BUN/CREAT SERPL: 15.9 (ref 7–25)
CALCIUM SPEC-SCNC: 9.9 MG/DL (ref 8.6–10.5)
CHLORIDE SERPL-SCNC: 101 MMOL/L (ref 98–107)
CHOLEST SERPL-MCNC: 157 MG/DL (ref 0–200)
CO2 SERPL-SCNC: 24.4 MMOL/L (ref 22–29)
CREAT SERPL-MCNC: 0.63 MG/DL (ref 0.76–1.27)
GFR SERPL CREATININE-BSD FRML MDRD: 135 ML/MIN/1.73
GLOBULIN UR ELPH-MCNC: 3.1 GM/DL
GLUCOSE SERPL-MCNC: 88 MG/DL (ref 65–99)
HBA1C MFR BLD: 6.18 % (ref 4.8–5.6)
HCV AB SER DONR QL: NORMAL
HDLC SERPL-MCNC: 53 MG/DL (ref 40–60)
LDLC SERPL CALC-MCNC: 86 MG/DL (ref 0–100)
LDLC/HDLC SERPL: 1.59 {RATIO}
POTASSIUM SERPL-SCNC: 4.5 MMOL/L (ref 3.5–5.2)
PROT SERPL-MCNC: 7.5 G/DL (ref 6–8.5)
SODIUM SERPL-SCNC: 136 MMOL/L (ref 136–145)
TRIGL SERPL-MCNC: 98 MG/DL (ref 0–150)
VLDLC SERPL-MCNC: 18 MG/DL (ref 5–40)

## 2020-12-18 PROCEDURE — 80061 LIPID PANEL: CPT | Performed by: FAMILY MEDICINE

## 2020-12-18 PROCEDURE — 36415 COLL VENOUS BLD VENIPUNCTURE: CPT | Performed by: FAMILY MEDICINE

## 2020-12-18 PROCEDURE — 86803 HEPATITIS C AB TEST: CPT

## 2020-12-18 PROCEDURE — 83036 HEMOGLOBIN GLYCOSYLATED A1C: CPT | Performed by: FAMILY MEDICINE

## 2020-12-18 PROCEDURE — 80053 COMPREHEN METABOLIC PANEL: CPT | Performed by: FAMILY MEDICINE

## 2020-12-23 ENCOUNTER — TELEPHONE (OUTPATIENT)
Dept: FAMILY MEDICINE CLINIC | Facility: CLINIC | Age: 50
End: 2020-12-23

## 2021-03-08 ENCOUNTER — OFFICE VISIT (OUTPATIENT)
Dept: FAMILY MEDICINE CLINIC | Facility: CLINIC | Age: 51
End: 2021-03-08

## 2021-03-08 VITALS
WEIGHT: 311.25 LBS | OXYGEN SATURATION: 98 % | DIASTOLIC BLOOD PRESSURE: 80 MMHG | BODY MASS INDEX: 47.17 KG/M2 | SYSTOLIC BLOOD PRESSURE: 132 MMHG | HEIGHT: 68 IN | HEART RATE: 104 BPM

## 2021-03-08 DIAGNOSIS — F32.A DEPRESSIVE DISORDER: ICD-10-CM

## 2021-03-08 DIAGNOSIS — R68.82 DECREASED LIBIDO: ICD-10-CM

## 2021-03-08 DIAGNOSIS — E78.01 FAMILIAL HYPERCHOLESTEROLEMIA: Primary | ICD-10-CM

## 2021-03-08 DIAGNOSIS — F41.1 GENERALIZED ANXIETY DISORDER: ICD-10-CM

## 2021-03-08 PROBLEM — R12 HEARTBURN: Status: ACTIVE | Noted: 2018-04-09

## 2021-03-08 PROBLEM — M54.50 LOW BACK PAIN: Status: ACTIVE | Noted: 2018-04-09

## 2021-03-08 PROBLEM — E78.5 HYPERLIPEMIA: Status: ACTIVE | Noted: 2018-04-09

## 2021-03-08 PROCEDURE — 99214 OFFICE O/P EST MOD 30 MIN: CPT | Performed by: FAMILY MEDICINE

## 2021-03-08 RX ORDER — ESCITALOPRAM OXALATE 20 MG/1
20 TABLET ORAL DAILY
Qty: 30 TABLET | Refills: 3 | Status: SHIPPED | OUTPATIENT
Start: 2021-03-08 | End: 2021-05-25 | Stop reason: SDUPTHER

## 2021-03-08 RX ORDER — ATORVASTATIN CALCIUM 40 MG/1
40 TABLET, FILM COATED ORAL DAILY
Qty: 90 TABLET | Refills: 3 | Status: SHIPPED | OUTPATIENT
Start: 2021-03-08 | End: 2022-01-06 | Stop reason: SDUPTHER

## 2021-03-08 RX ORDER — ESCITALOPRAM OXALATE 10 MG/1
TABLET ORAL
Qty: 135 TABLET | Refills: 5 | Status: CANCELLED | OUTPATIENT
Start: 2021-03-08

## 2021-03-08 RX ORDER — CLONAZEPAM 0.5 MG/1
0.5 TABLET ORAL 2 TIMES DAILY PRN
Qty: 60 TABLET | Refills: 3 | Status: SHIPPED | OUTPATIENT
Start: 2021-03-08 | End: 2021-05-25 | Stop reason: SDUPTHER

## 2021-03-08 NOTE — PROGRESS NOTES
Subjective   Jamey Berry is a 50 y.o. male.   Cc: follow up of chronic medical issues  His sex drive has been down for five to six months. He has no desire to have sex.  Hyperlipidemia  This is a chronic problem. The current episode started more than 1 year ago. The problem is resistant. Pertinent negatives include no chest pain or shortness of breath. Current antihyperlipidemic treatment includes statins.   Depression  Visit Type: follow-up  Patient presents with the following symptoms: depressed mood, fatigue, irritability, nervousness/anxiety and weight gain.  Patient is not experiencing: anhedonia, chest pain, choking sensation, compulsions, confusion, decreased concentration, dizziness, dry mouth, excessive worry, feelings of hopelessness, feelings of worthlessness, hypersomnia, hyperventilation, impotence, insomnia, malaise, memory impairment, muscle tension, nausea, obsessions, palpitations, panic, restlessness, shortness of breath, suicidal ideas, suicidal planning, thoughts of death and weight loss.    Anxiety  Presents for follow-up visit. Symptoms include depressed mood, irritability and nervous/anxious behavior. Patient reports no chest pain, compulsions, confusion, decreased concentration, dizziness, dry mouth, excessive worry, feeling of choking, hyperventilation, impotence, insomnia, malaise, muscle tension, nausea, obsessions, palpitations, panic, restlessness, shortness of breath or suicidal ideas.     His past medical history is significant for depression.       The following portions of the patient's history were reviewed and updated as appropriate: allergies, current medications, past family history, past medical history, past social history, past surgical history and problem list.    Review of Systems   Constitutional: Positive for irritability and weight gain. Negative for weight loss.   Respiratory: Negative for choking and shortness of breath.    Cardiovascular: Negative for chest  "pain and palpitations.   Gastrointestinal: Negative for nausea.   Genitourinary: Negative for impotence.   Neurological: Negative for dizziness.   Psychiatric/Behavioral: Negative for confusion, decreased concentration and suicidal ideas. The patient is nervous/anxious. The patient does not have insomnia.        Objective   Physical Exam  Vitals reviewed.   Constitutional:       Appearance: Normal appearance.   HENT:      Head: Normocephalic and atraumatic.      Right Ear: Tympanic membrane, ear canal and external ear normal.      Left Ear: Tympanic membrane, ear canal and external ear normal.      Nose: Nose normal.      Mouth/Throat:      Mouth: Mucous membranes are moist.      Pharynx: Oropharynx is clear.   Eyes:      Extraocular Movements: Extraocular movements intact.      Pupils: Pupils are equal, round, and reactive to light.   Cardiovascular:      Rate and Rhythm: Normal rate and regular rhythm.      Heart sounds: Normal heart sounds. No murmur. No friction rub. No gallop.    Pulmonary:      Effort: Pulmonary effort is normal. No respiratory distress.      Breath sounds: Normal breath sounds. No stridor. No wheezing, rhonchi or rales.   Chest:      Chest wall: No tenderness.   Abdominal:      General: Abdomen is flat. Bowel sounds are normal. There is no distension.      Palpations: Abdomen is soft.      Tenderness: There is no abdominal tenderness. There is no guarding.   Musculoskeletal:      Cervical back: Normal range of motion and neck supple.   Skin:     General: Skin is warm and dry.   Neurological:      Mental Status: He is alert.           Visit Vitals  /80   Pulse 104   Ht 172.7 cm (68\")   Wt (!) 141 kg (311 lb 4 oz)   SpO2 98%   BMI 47.33 kg/m²     Body mass index is 47.33 kg/m².      Assessment/Plan   Diagnoses and all orders for this visit:    1. Familial hypercholesterolemia (Primary)  -     Comprehensive metabolic panel; Future  -     CBC w AUTO Differential; Future  -     Lipid panel; " Future    2. Generalized anxiety disorder  -     clonazePAM (KlonoPIN) 0.5 MG tablet; Take 1 tablet by mouth 2 (Two) Times a Day As Needed for Anxiety.  Dispense: 60 tablet; Refill: 3    3. Depressive disorder    4. Decreased libido  -     Testosterone; Future    Other orders  -     atorvastatin (LIPITOR) 40 MG tablet; Take 1 tablet by mouth Daily.  Dispense: 90 tablet; Refill: 3  -     Cancel: escitalopram (LEXAPRO) 10 MG tablet; Take 1 and 1/2 tablets by mouth at bedtime  Dispense: 135 tablet; Refill: 5  -     escitalopram (Lexapro) 20 MG tablet; Take 1 tablet by mouth Daily.  Dispense: 30 tablet; Refill: 3    OLY is appropriate.  Return to the clinic in 3 month/s.  Will contact with results as needed.

## 2021-05-25 DIAGNOSIS — F41.1 GENERALIZED ANXIETY DISORDER: ICD-10-CM

## 2021-05-25 RX ORDER — ESCITALOPRAM OXALATE 20 MG/1
20 TABLET ORAL DAILY
Qty: 30 TABLET | Refills: 3 | Status: SHIPPED | OUTPATIENT
Start: 2021-05-25 | End: 2021-06-08 | Stop reason: SDUPTHER

## 2021-05-25 RX ORDER — CLONAZEPAM 0.5 MG/1
0.5 TABLET ORAL 2 TIMES DAILY PRN
Qty: 60 TABLET | Refills: 3 | Status: SHIPPED | OUTPATIENT
Start: 2021-05-25 | End: 2022-07-01 | Stop reason: SDUPTHER

## 2021-06-08 ENCOUNTER — OFFICE VISIT (OUTPATIENT)
Dept: FAMILY MEDICINE CLINIC | Facility: CLINIC | Age: 51
End: 2021-06-08

## 2021-06-08 VITALS
HEIGHT: 68 IN | WEIGHT: 313 LBS | DIASTOLIC BLOOD PRESSURE: 80 MMHG | TEMPERATURE: 97.8 F | HEART RATE: 97 BPM | BODY MASS INDEX: 47.44 KG/M2 | OXYGEN SATURATION: 94 % | RESPIRATION RATE: 18 BRPM | SYSTOLIC BLOOD PRESSURE: 112 MMHG

## 2021-06-08 DIAGNOSIS — R53.83 FATIGUE, UNSPECIFIED TYPE: ICD-10-CM

## 2021-06-08 DIAGNOSIS — E78.00 PURE HYPERCHOLESTEROLEMIA: ICD-10-CM

## 2021-06-08 DIAGNOSIS — Z51.81 THERAPEUTIC DRUG MONITORING: Primary | ICD-10-CM

## 2021-06-08 DIAGNOSIS — R73.9 HYPERGLYCEMIA: ICD-10-CM

## 2021-06-08 PROCEDURE — 99214 OFFICE O/P EST MOD 30 MIN: CPT | Performed by: FAMILY MEDICINE

## 2021-06-08 PROCEDURE — 93000 ELECTROCARDIOGRAM COMPLETE: CPT | Performed by: FAMILY MEDICINE

## 2021-06-08 RX ORDER — ESCITALOPRAM OXALATE 20 MG/1
20 TABLET ORAL DAILY
Qty: 90 TABLET | Refills: 0 | Status: SHIPPED | OUTPATIENT
Start: 2021-06-08 | End: 2021-12-06

## 2021-06-08 RX ORDER — AZITHROMYCIN 250 MG/1
TABLET, FILM COATED ORAL
Qty: 6 TABLET | Refills: 0 | Status: SHIPPED | OUTPATIENT
Start: 2021-06-08 | End: 2021-07-08

## 2021-06-08 RX ORDER — ESCITALOPRAM OXALATE 20 MG/1
20 TABLET ORAL DAILY
Qty: 30 TABLET | Refills: 3 | Status: SHIPPED | OUTPATIENT
Start: 2021-06-08 | End: 2021-06-08 | Stop reason: SDUPTHER

## 2021-06-08 NOTE — PROGRESS NOTES
Subjective   Jamey Berry is a 50 y.o. male.     50-year-old male with history of hyperlipidemia morbid obesity      The following portions of the patient's history were reviewed and updated as appropriate: allergies, current medications, past family history, past medical history, past social history, past surgical history and problem list.    Review of Systems   Eyes: Positive for discharge and itching.        Right eye lateral-early stye   Respiratory: Negative for shortness of breath.    Cardiovascular: Negative for chest pain and leg swelling.     CONTROLLED SUBSTANCE TRACKING 6/8/2021   Last Pool 6/8/2021   Last UDS 6/8/2021   Last Controlled Substance Agreement 6/8/2021     Objective   Physical Exam  Vitals and nursing note reviewed.   Constitutional:       Appearance: He is obese.   Cardiovascular:      Rate and Rhythm: Normal rate and regular rhythm.      Pulses: Normal pulses.      Heart sounds: Normal heart sounds.   Pulmonary:      Effort: Pulmonary effort is normal.      Breath sounds: Normal breath sounds.   Abdominal:      Palpations: Abdomen is soft.   Musculoskeletal:      Right lower leg: No edema.      Left lower leg: No edema.   Skin:     General: Skin is warm and dry.   Neurological:      General: No focal deficit present.      Mental Status: He is alert and oriented to person, place, and time.   Psychiatric:         Mood and Affect: Mood normal.         Behavior: Behavior normal.         Thought Content: Thought content normal.         Judgment: Judgment normal.         Assessment/Plan   Diagnoses and all orders for this visit:    1. Therapeutic drug monitoring (Primary)  -     ToxASSURE Select 13 (MW) - Urine, Clean Catch    2. Hyperglycemia  -     Hemoglobin A1c; Future    3. Pure hypercholesterolemia  -     Comprehensive Metabolic Panel; Future  -     Lipid Panel; Future  -     ECG 12 Lead    4. Fatigue, unspecified type  -     CBC & Differential; Future    Other orders  -      escitalopram (Lexapro) 20 MG tablet; Take 1 tablet by mouth Daily.  Dispense: 30 tablet; Refill: 3  -     azithromycin (Zithromax Z-Zak) 250 MG tablet; Take 2 tablets the first day, then 1 tablet daily for 4 days.  Dispense: 6 tablet; Refill: 0         Plan EKG borderline-we will see what cholesterol looks like and then probably stress test because of family history being so significant  CONTROLLED SUBSTANCE TRACKING 6/8/2021   Last Pool 6/8/2021   Last UDS 6/8/2021   Last Controlled Substance Agreement 6/8/2021

## 2021-06-10 ENCOUNTER — LAB (OUTPATIENT)
Dept: FAMILY MEDICINE CLINIC | Facility: CLINIC | Age: 51
End: 2021-06-10

## 2021-06-11 DIAGNOSIS — R00.2 PALPITATIONS WITH REGULAR CARDIAC RHYTHM: ICD-10-CM

## 2021-06-11 DIAGNOSIS — I48.11 LONGSTANDING PERSISTENT ATRIAL FIBRILLATION (HCC): ICD-10-CM

## 2021-06-11 DIAGNOSIS — I51.89 STRUCTURAL HEART DISEASE: Primary | ICD-10-CM

## 2021-06-14 LAB — DRUGS UR: NORMAL

## 2021-07-08 ENCOUNTER — OFFICE VISIT (OUTPATIENT)
Dept: CARDIOLOGY | Facility: CLINIC | Age: 51
End: 2021-07-08

## 2021-07-08 ENCOUNTER — PREP FOR SURGERY (OUTPATIENT)
Dept: OTHER | Facility: HOSPITAL | Age: 51
End: 2021-07-08

## 2021-07-08 VITALS
DIASTOLIC BLOOD PRESSURE: 72 MMHG | OXYGEN SATURATION: 98 % | SYSTOLIC BLOOD PRESSURE: 132 MMHG | HEIGHT: 68 IN | HEART RATE: 105 BPM | BODY MASS INDEX: 47.29 KG/M2 | WEIGHT: 312 LBS

## 2021-07-08 DIAGNOSIS — Z71.89 CARDIAC RISK COUNSELING: ICD-10-CM

## 2021-07-08 DIAGNOSIS — E66.01 MORBID (SEVERE) OBESITY DUE TO EXCESS CALORIES (HCC): Primary | ICD-10-CM

## 2021-07-08 DIAGNOSIS — E78.01 FAMILIAL HYPERCHOLESTEROLEMIA: ICD-10-CM

## 2021-07-08 PROCEDURE — 93000 ELECTROCARDIOGRAM COMPLETE: CPT | Performed by: INTERNAL MEDICINE

## 2021-07-08 PROCEDURE — 99204 OFFICE O/P NEW MOD 45 MIN: CPT | Performed by: INTERNAL MEDICINE

## 2021-07-08 RX ORDER — ASPIRIN 81 MG/1
81 TABLET ORAL DAILY
Start: 2021-07-08

## 2021-07-08 NOTE — PROGRESS NOTES
"    [facility] - CARDIOLOGY  New Patient Initial Outpatient Evaluation    Primary Care Physician: Rajinder Leonardo MD    Subjective     Chief Complaint: \"Not sure why I am here\"    History of Present Illness    Mr. Berry presents to the clinic today as a new patient. It is not clear why he was referred to me at this time, and the referra packet does not specify his reason for referral.     The patient denies being diagnosed with atrial fibrillation or a structural heart problem in the past. He notes that he had a medically-induced stress test performed in 2011, secondary to some chest tightness he was experiencing. He believes that this chest tightness was attributed to panic attacks he was suffering from at that time. According to the patient, his stress test results were normal. He has never worn a heart monitor.     He denies any current symptoms of palpitations, shortness of breath, or chest pain. He is often climbing steps at his workplace and has no dyspnea on exertion while doing so. He has noticed that when trying to take his own pulse, he notices an extra beat. The patient is a nonsmoker. He has been diagnosed with sleep apnea and wears a CPAP. He has taken aspirin 81 mg in the past but was told to discontinue this by his previous primary care provider; he is unsure why.       Review of Systems   Constitutional: Negative for malaise/fatigue.   Cardiovascular: Negative for chest pain, claudication, dyspnea on exertion, leg swelling, near-syncope, orthopnea, palpitations, paroxysmal nocturnal dyspnea and syncope.   Respiratory: Negative for shortness of breath.    Hematologic/Lymphatic: Does not bruise/bleed easily.    Otherwise complete ROS reviewed and negative except as mentioned in the HPI.      Past Medical History:   Past Medical History:   Diagnosis Date   • Arthritis    • Chest pain    • Depressive disorder    • Dyspnea    • Elevated blood pressure reading without diagnosis of hypertension    • " Generalized anxiety disorder    • Hip pain     ortho recommended replacement   • Hyperlipidemia    • Keloid scar     r/o cancer   • Morbid obesity (CMS/HCC)    • Need for immunization against influenza    • Pain in lower limb     right post fx/phong   • Panic disorder    • Skin cancer     basal cell removed from scalp   • Sleep apnea        Past Surgical History:  Past Surgical History:   Procedure Laterality Date   • HARDWARE REMOVAL Right 8/14/2018    Procedure: HARDWARE REMOVAL RIGHT HIP;  Surgeon: Luiz Durham MD;  Location: [facility];  Service: Orthopedics   • HARDWARE REMOVAL Right 8/14/2018    Procedure: TOTAL HIP REPLACEMENT POSTERIOR APPROACH;  Surgeon: Luiz Durham MD;  Location: [facility];  Service: Orthopedics   • ORIF FEMUR FRACTURE  1992    hip and ankle done at same time secondary to trauma   • TOTAL HIP ARTHROPLASTY Right 8/14/2018    Procedure: HARDWARE REMOVAL RIGHT FEMUR;  Surgeon: Luiz uDrham MD;  Location: [facility];  Service: Orthopedics       Family History: family history includes Alzheimer's disease in an other family member; Asthma in an other family member; Cancer in an other family member; Coronary artery disease in an other family member; Dementia in an other family member; Depression in an other family member; Heart disease in an other family member; Hypertension in an other family member; Migraines in an other family member.    Social History:  reports that he has quit smoking. He has never used smokeless tobacco. He reports current alcohol use of about 5.0 standard drinks of alcohol per week. He reports that he does not use drugs.    Medications:  Prior to Admission medications    Medication Sig Start Date End Date Taking? Authorizing Provider   atorvastatin (LIPITOR) 40 MG tablet Take 1 tablet by mouth Daily. 3/8/21  Yes Jarek Salgado MD   clonazePAM (KlonoPIN) 0.5 MG tablet Take 1 tablet by mouth 2 (Two) Times a Day As Needed for Anxiety. 5/25/21   "Yes Jarek Salgado MD   escitalopram (Lexapro) 20 MG tablet Take 1 tablet by mouth Daily. 6/8/21  Yes Rajinder Leonardo MD   aspirin (aspirin) 81 MG EC tablet Take 1 tablet by mouth Daily. 7/8/21   Dwight Harmon MD   azithromycin (Zithromax Z-Zak) 250 MG tablet Take 2 tablets the first day, then 1 tablet daily for 4 days. 6/8/21 7/8/21  Rajinder Leonardo MD     Allergies:  Allergies   Allergen Reactions   • Iodine Itching   • Penicillins Itching       Objective     Vital Signs: /72   Pulse 105   Ht 172.7 cm (68\")   Wt (!) 142 kg (312 lb)   SpO2 98%   BMI 47.44 kg/m²     Vitals and nursing note reviewed.   Constitutional:       General: Not in acute distress.     Appearance: Not in distress.   Neck:      Vascular: No JVD or JVR. JVD normal.   Pulmonary:      Effort: Pulmonary effort is normal.      Breath sounds: Normal breath sounds.   Cardiovascular:      Normal rate. Regular rhythm.      Murmurs: There is no murmur.      No gallop. No rub.   Pulses:     Intact distal pulses.   Edema:     Peripheral edema absent.   Skin:     General: Skin is warm and dry.   Neurological:      Mental Status: Alert, oriented to person, place, and time and oriented to person, place and time.         Results Reviewed:  Hemoglobin A1c (06/10/2021 08:31)   Lipid Panel (06/10/2021 08:31)       ECG 12 Lead    Date/Time: 7/8/2021 2:10 PM  Performed by: Dwight Harmon MD  Authorized by: Dwight Harmon MD   Comparison: compared with previous ECG from 6/8/2021  Similar to previous ECG  Rhythm: sinus tachycardia  Ectopy: atrial premature contractions  BPM: 105  Other findings: poor R wave progression    Clinical impression: abnormal EKG              Lab Results   Component Value Date    CHOL 157 12/18/2020    TRIG 74 06/10/2021    HDL 43 06/10/2021    VLDL 15 06/10/2021    LDLHDL 1.59 12/18/2020     Lab Results   Component Value Date    HGBA1C 6.40 (H) 06/10/2021           Assessment / Plan        Problem " "List Items Addressed This Visit        Cardiac and Vasculature    Hyperlipemia    Cardiac risk counseling       Endocrine and Metabolic    Morbid (severe) obesity due to excess calories (CMS/[facility]) - Primary        Recommendations/plans:  The patient has no cardiovascular complaints. Certainly, he is at risk for heart disease given his obesity and hyperlipidemia, and now pre-diabetic status. However, he already follows with Dr. Leonardo who manages these risk factors. We discussed how there is no cardiac test or procedure that fulfills a preventative role, but we investigate with testing such as stress tests when patients have symptoms that are concerning for ischemia. Currently, he does not have any symptoms, so no further testing is warranted.     We did spend a significant amount of time discussing primary prevention and the ways to achieve this being a heart-healthy diet, regular aerobic activity, and close primary care follow-up for risk factor screening modification, and in his particular case, weight loss.  I congratulated him on not smoking. I did suggest that he add an aspirin 81 mg daily to his medical regimen for primary prevention given the presence of the other risk factors previously mentioned.     I also reached out to  to see if there was some other issue that he was aware of for the referral since there was no documentation in the referral packet or any other office notes to suggest why the referral was placed, but yet somehow in our system, the visit was labeled for \"long-standing persistent atrial fibrillation.\" The patient does not report any history of atrial fibrillation, and all available EKGs that I see simply show sinus rhythm or sinus tachycardia with occasional PACs. Therefore, unless Dr. Leonardo provides something that I am otherwise unaware of no further testing or follow-up is required at this point in time.      Scribed for Dwight Harmon MD by Rosio Joyner.  07/08/21   16:00 " CDT    I Dwight Harmon MD have personally performed the services described in this document as scribed by the above individual, and it is both accurate and complete.   I have edited each component as needed.    Dwight Harmon MD  7/8/2021  23:38 CDT

## 2021-08-27 PROCEDURE — 87635 SARS-COV-2 COVID-19 AMP PRB: CPT | Performed by: NURSE PRACTITIONER

## 2021-12-06 RX ORDER — ESCITALOPRAM OXALATE 20 MG/1
TABLET ORAL
Qty: 90 TABLET | Refills: 0 | Status: SHIPPED | OUTPATIENT
Start: 2021-12-06 | End: 2022-01-06 | Stop reason: SDUPTHER

## 2022-01-06 RX ORDER — ESCITALOPRAM OXALATE 20 MG/1
20 TABLET ORAL DAILY
Qty: 90 TABLET | Refills: 0 | Status: SHIPPED | OUTPATIENT
Start: 2022-01-06 | End: 2022-07-05

## 2022-01-06 NOTE — TELEPHONE ENCOUNTER
Rx Refill Note  Requested Prescriptions     Pending Prescriptions Disp Refills   • escitalopram (LEXAPRO) 20 MG tablet 90 tablet 0     Sig: Take 1 tablet by mouth Daily.      Last office visit with prescribing clinician: 6/8/2021      Next office visit with prescribing clinician: Visit date not found  Last RX:  12.06.2021- *too early for refill   Dedrick Canada MA  01/06/22, 17:11 CST

## 2022-01-07 RX ORDER — ATORVASTATIN CALCIUM 40 MG/1
40 TABLET, FILM COATED ORAL DAILY
Qty: 90 TABLET | Refills: 3 | Status: SHIPPED | OUTPATIENT
Start: 2022-01-07 | End: 2023-01-10

## 2022-07-01 ENCOUNTER — OFFICE VISIT (OUTPATIENT)
Dept: FAMILY MEDICINE CLINIC | Facility: CLINIC | Age: 52
End: 2022-07-01

## 2022-07-01 VITALS
OXYGEN SATURATION: 93 % | HEIGHT: 68 IN | WEIGHT: 313.8 LBS | DIASTOLIC BLOOD PRESSURE: 78 MMHG | BODY MASS INDEX: 47.56 KG/M2 | TEMPERATURE: 98.6 F | SYSTOLIC BLOOD PRESSURE: 112 MMHG | HEART RATE: 95 BPM | RESPIRATION RATE: 20 BRPM

## 2022-07-01 DIAGNOSIS — Z12.11 SCREENING FOR COLORECTAL CANCER: ICD-10-CM

## 2022-07-01 DIAGNOSIS — M65.9 SYNOVITIS: ICD-10-CM

## 2022-07-01 DIAGNOSIS — R73.9 HYPERGLYCEMIA: Primary | ICD-10-CM

## 2022-07-01 DIAGNOSIS — Z12.12 SCREENING FOR COLORECTAL CANCER: ICD-10-CM

## 2022-07-01 DIAGNOSIS — Z12.5 SPECIAL SCREENING FOR MALIGNANT NEOPLASM OF PROSTATE: ICD-10-CM

## 2022-07-01 DIAGNOSIS — Z00.00 ROUTINE GENERAL MEDICAL EXAMINATION AT A HEALTH CARE FACILITY: ICD-10-CM

## 2022-07-01 DIAGNOSIS — Z51.81 THERAPEUTIC DRUG MONITORING: ICD-10-CM

## 2022-07-01 DIAGNOSIS — F41.1 GENERALIZED ANXIETY DISORDER: ICD-10-CM

## 2022-07-01 LAB
BILIRUB BLD-MCNC: NEGATIVE MG/DL
CLARITY, POC: CLEAR
COLOR UR: YELLOW
GLUCOSE UR STRIP-MCNC: NEGATIVE MG/DL
KETONES UR QL: NEGATIVE
LEUKOCYTE EST, POC: NEGATIVE
NITRITE UR-MCNC: NEGATIVE MG/ML
PH UR: 5 [PH] (ref 5–8)
PROT UR STRIP-MCNC: NEGATIVE MG/DL
RBC # UR STRIP: NEGATIVE /UL
SP GR UR: 1.01 (ref 1–1.03)
UROBILINOGEN UR QL: NORMAL

## 2022-07-01 PROCEDURE — 81003 URINALYSIS AUTO W/O SCOPE: CPT | Performed by: FAMILY MEDICINE

## 2022-07-01 PROCEDURE — 99396 PREV VISIT EST AGE 40-64: CPT | Performed by: FAMILY MEDICINE

## 2022-07-01 RX ORDER — CLONAZEPAM 0.5 MG/1
0.5 TABLET ORAL 2 TIMES DAILY PRN
Qty: 60 TABLET | Refills: 0 | Status: SHIPPED | OUTPATIENT
Start: 2022-07-01

## 2022-07-01 NOTE — PROGRESS NOTES
Chief Complaint   Patient presents with   • Medicare Wellness-subsequent     Not fasting.  Coke Zero.  Drug therapy monitoring       History:  Jamey Berry is a 51 y.o. male who presents today for evaluation of the above problems.      51-year-old male for wellness check        ROS:  Review of Systems   Constitutional:        COVID booster with Pfizer advised   Respiratory: Positive for apnea. Negative for shortness of breath.         Uses CPAP with regularity does not miss it can tell if he is noncompliant with tiredness and fatigue-will need for lifetime with his comorbidities   Cardiovascular: Negative for chest pain and leg swelling.   Gastrointestinal:        Referred for colonoscopy screening   Genitourinary: Negative for difficulty urinating.   Musculoskeletal: Positive for arthralgias.        Right hip replacement remote   Psychiatric/Behavioral: The patient is nervous/anxious.         History of panic attacks- seldom   All other systems reviewed and are negative.      Allergies   Allergen Reactions   • Iodine Itching   • Penicillins Itching     Past Medical History:   Diagnosis Date   • Arthritis    • Chest pain    • Depressive disorder    • Dyspnea    • Elevated blood pressure reading without diagnosis of hypertension    • Generalized anxiety disorder    • Hip pain     ortho recommended replacement   • Hyperlipidemia    • Keloid scar     r/o cancer   • Morbid obesity (HCC)    • Need for immunization against influenza    • Pain in lower limb     right post fx/phong   • Panic disorder    • Skin cancer     basal cell removed from scalp   • Sleep apnea      Past Surgical History:   Procedure Laterality Date   • HARDWARE REMOVAL Right 8/14/2018    Procedure: HARDWARE REMOVAL RIGHT HIP;  Surgeon: Luiz Durham MD;  Location: [facility];  Service: Orthopedics   • HARDWARE REMOVAL Right 8/14/2018    Procedure: TOTAL HIP REPLACEMENT POSTERIOR APPROACH;  Surgeon: Luiz Durham MD;  Location:   "[facility];  Service: Orthopedics   • ORIF FEMUR FRACTURE  1992    hip and ankle done at same time secondary to trauma   • TOTAL HIP ARTHROPLASTY Right 8/14/2018    Procedure: HARDWARE REMOVAL RIGHT FEMUR;  Surgeon: Luiz Durham MD;  Location: [facility] OR;  Service: Orthopedics     Family History   Problem Relation Age of Onset   • Alzheimer's disease Other    • Asthma Other    • Coronary artery disease Other    • Dementia Other    • Depression Other    • Heart disease Other    • Hypertension Other    • Cancer Other         lung   • Migraines Other       reports that he quit smoking about 17 years ago. He has a 3.50 pack-year smoking history. He has never used smokeless tobacco. He reports current alcohol use of about 5.0 standard drinks of alcohol per week. He reports that he does not use drugs.      Current Outpatient Medications:   •  aspirin (aspirin) 81 MG EC tablet, Take 1 tablet by mouth Daily., Disp: , Rfl:   •  atorvastatin (LIPITOR) 40 MG tablet, Take 1 tablet by mouth Daily., Disp: 90 tablet, Rfl: 3  •  clonazePAM (KlonoPIN) 0.5 MG tablet, Take 1 tablet by mouth 2 (Two) Times a Day As Needed for Anxiety., Disp: 60 tablet, Rfl: 0  •  escitalopram (LEXAPRO) 20 MG tablet, Take 1 tablet by mouth Daily., Disp: 90 tablet, Rfl: 0    OBJECTIVE:  /78 (BP Location: Left arm, Patient Position: Sitting, Cuff Size: Large Adult)   Pulse 95   Temp 98.6 °F (37 °C) (Temporal)   Resp 20   Ht 172.7 cm (68\")   Wt (!) 142 kg (313 lb 12.8 oz)   SpO2 93%   BMI 47.71 kg/m²    Physical Exam  Vitals and nursing note reviewed.   Constitutional:       Appearance: He is obese.   HENT:      Head: Atraumatic.      Right Ear: Tympanic membrane and ear canal normal.      Left Ear: Tympanic membrane and ear canal normal.   Eyes:      Extraocular Movements: Extraocular movements intact.      Pupils: Pupils are equal, round, and reactive to light.   Neck:      Vascular: No carotid bruit.   Cardiovascular:      Rate and " Rhythm: Normal rate and regular rhythm.      Pulses: Normal pulses.      Heart sounds: Normal heart sounds.   Pulmonary:      Effort: Pulmonary effort is normal.      Breath sounds: Normal breath sounds.   Abdominal:      General: Abdomen is flat.      Palpations: There is no mass.   Genitourinary:     Penis: Normal.       Testes: Normal.      Prostate: Normal.      Comments: No testicular mass inguinal hernia or adenopathy-prostate 2+ no nodules  Musculoskeletal:      Right lower leg: No edema.      Left lower leg: No edema.   Lymphadenopathy:      Cervical: No cervical adenopathy.   Skin:     General: Skin is warm and dry.   Neurological:      General: No focal deficit present.      Mental Status: He is alert and oriented to person, place, and time.   Psychiatric:         Mood and Affect: Mood normal.         Behavior: Behavior normal.         Thought Content: Thought content normal.         Judgment: Judgment normal.       CONTROLLED SUBSTANCE TRACKING 6/8/2021 7/1/2022   Last Pool 6/8/2021 7/1/2022   Last UDS 6/8/2021 7/1/2022   Last Controlled Substance Agreement 6/8/2021 7/1/2022       Class 3 Severe Obesity (BMI >=40). Obesity-related health conditions include the following: obstructive sleep apnea, dyslipidemias and osteoarthritis. Obesity is unchanged. BMI is is above average; BMI management plan is completed. We discussed portion control and increasing exercise.      Health Maintenance:  Immunization History   Administered Date(s) Administered   • COVID-19 (GERALDO) 09/13/2021   • Flucelvax Quad Vial =>4yrs 12/28/2018   • Fluzone High Dose =>65 Years ([employer] ONLY) 12/04/2015   • Influenza, Unspecified 10/15/2017, 12/28/2018   • Tdap 12/14/2016        Colonoscopy:  Due at 46--referred    Assessment/Plan    Diagnoses and all orders for this visit:    1. Hyperglycemia (Primary)  -     Hemoglobin A1c  -     POC Urinalysis Dipstick, Multipro    2. Routine general medical examination at a [facility]  facility  -     TSH  -     T4, free  -     CBC & Differential  -     Comprehensive Metabolic Panel  -     Lipid Panel    3. Synovitis  -     Sedimentation Rate  -     C-reactive Protein    4. Special screening for malignant neoplasm of prostate  -     PSA Screen    5. Therapeutic drug monitoring  -     ToxASSURE Select 13 (MW) - Urine, Clean Catch    6. Generalized anxiety disorder  -     clonazePAM (KlonoPIN) 0.5 MG tablet; Take 1 tablet by mouth 2 (Two) Times a Day As Needed for Anxiety.  Dispense: 60 tablet; Refill: 0    7. Screening for colorectal cancer  -     Ambulatory Referral to Gastroenterology      Plan above-refer for colonoscopy-Mediterranean diet etc.    An After Visit Summary was printed and given to the patient at discharge.  Return in 3 months (on 10/1/2022).         Rajinder Leonardo MD 7/1/2022   Electronically signed.

## 2022-07-02 LAB
ALBUMIN SERPL-MCNC: 4.4 G/DL (ref 3.5–5.2)
ALBUMIN/GLOB SERPL: 1.5 G/DL
ALP SERPL-CCNC: 115 U/L (ref 39–117)
ALT SERPL-CCNC: 32 U/L (ref 1–41)
AST SERPL-CCNC: 25 U/L (ref 1–40)
BASOPHILS # BLD AUTO: 0.06 10*3/MM3 (ref 0–0.2)
BASOPHILS NFR BLD AUTO: 1 % (ref 0–1.5)
BILIRUB SERPL-MCNC: 0.7 MG/DL (ref 0–1.2)
BUN SERPL-MCNC: 18 MG/DL (ref 6–20)
BUN/CREAT SERPL: 26.1 (ref 7–25)
CALCIUM SERPL-MCNC: 9.2 MG/DL (ref 8.6–10.5)
CHLORIDE SERPL-SCNC: 105 MMOL/L (ref 98–107)
CHOLEST SERPL-MCNC: 167 MG/DL (ref 0–200)
CO2 SERPL-SCNC: 22.6 MMOL/L (ref 22–29)
CREAT SERPL-MCNC: 0.69 MG/DL (ref 0.76–1.27)
CRP SERPL-MCNC: 0.78 MG/DL (ref 0–0.5)
EGFRCR SERPLBLD CKD-EPI 2021: 112.7 ML/MIN/1.73
EOSINOPHIL # BLD AUTO: 0.24 10*3/MM3 (ref 0–0.4)
EOSINOPHIL NFR BLD AUTO: 3.9 % (ref 0.3–6.2)
ERYTHROCYTE [DISTWIDTH] IN BLOOD BY AUTOMATED COUNT: 13 % (ref 12.3–15.4)
ERYTHROCYTE [SEDIMENTATION RATE] IN BLOOD BY WESTERGREN METHOD: 27 MM/HR (ref 0–15)
GLOBULIN SER CALC-MCNC: 2.9 GM/DL
GLUCOSE SERPL-MCNC: 109 MG/DL (ref 65–99)
HBA1C MFR BLD: 6.1 % (ref 4.8–5.6)
HCT VFR BLD AUTO: 48.3 % (ref 37.5–51)
HDLC SERPL-MCNC: 45 MG/DL (ref 40–60)
HGB BLD-MCNC: 15.8 G/DL (ref 13–17.7)
IMM GRANULOCYTES # BLD AUTO: 0.03 10*3/MM3 (ref 0–0.05)
IMM GRANULOCYTES NFR BLD AUTO: 0.5 % (ref 0–0.5)
LDLC SERPL CALC-MCNC: 105 MG/DL (ref 0–100)
LYMPHOCYTES # BLD AUTO: 1.8 10*3/MM3 (ref 0.7–3.1)
LYMPHOCYTES NFR BLD AUTO: 29.3 % (ref 19.6–45.3)
MCH RBC QN AUTO: 28.2 PG (ref 26.6–33)
MCHC RBC AUTO-ENTMCNC: 32.7 G/DL (ref 31.5–35.7)
MCV RBC AUTO: 86.1 FL (ref 79–97)
MONOCYTES # BLD AUTO: 0.53 10*3/MM3 (ref 0.1–0.9)
MONOCYTES NFR BLD AUTO: 8.6 % (ref 5–12)
NEUTROPHILS # BLD AUTO: 3.49 10*3/MM3 (ref 1.7–7)
NEUTROPHILS NFR BLD AUTO: 56.7 % (ref 42.7–76)
NRBC BLD AUTO-RTO: 0 /100 WBC (ref 0–0.2)
PLATELET # BLD AUTO: 298 10*3/MM3 (ref 140–450)
POTASSIUM SERPL-SCNC: 4.6 MMOL/L (ref 3.5–5.2)
PROT SERPL-MCNC: 7.3 G/DL (ref 6–8.5)
PSA SERPL-MCNC: 0.58 NG/ML (ref 0–4)
RBC # BLD AUTO: 5.61 10*6/MM3 (ref 4.14–5.8)
SODIUM SERPL-SCNC: 138 MMOL/L (ref 136–145)
T4 FREE SERPL-MCNC: 1.05 NG/DL (ref 0.93–1.7)
TRIGL SERPL-MCNC: 89 MG/DL (ref 0–150)
TSH SERPL DL<=0.005 MIU/L-ACNC: 1.51 UIU/ML (ref 0.27–4.2)
VLDLC SERPL CALC-MCNC: 17 MG/DL (ref 5–40)
WBC # BLD AUTO: 6.15 10*3/MM3 (ref 3.4–10.8)

## 2022-07-05 DIAGNOSIS — R53.83 OTHER FATIGUE: ICD-10-CM

## 2022-07-05 DIAGNOSIS — M25.50 ARTHRALGIA, UNSPECIFIED JOINT: Primary | ICD-10-CM

## 2022-07-05 DIAGNOSIS — R73.9 HYPERGLYCEMIA: ICD-10-CM

## 2022-07-05 RX ORDER — ESCITALOPRAM OXALATE 20 MG/1
TABLET ORAL
Qty: 90 TABLET | Refills: 3 | Status: SHIPPED | OUTPATIENT
Start: 2022-07-05 | End: 2022-10-21

## 2022-07-05 NOTE — TELEPHONE ENCOUNTER
Rx Refill Note  Requested Prescriptions     Pending Prescriptions Disp Refills   • escitalopram (LEXAPRO) 20 MG tablet [Pharmacy Med Name: ESCITALOPRAM 20 MG TABLET] 90 tablet 0     Sig: TAKE 1 TABLET BY MOUTH EVERY DAY      Last office visit with prescribing clinician: Visit date not found      Next office visit with prescribing clinician: Visit date not found   CPE done 07/01/2022    {TIP  Please add Last Relevant Lab Date if appropriate: 07/01/2022  {TIP  Is Refill Pharmacy correct?: yes  Yahaira Kaur MA  07/05/22, 07:53 CDT

## 2022-07-10 LAB — DRUGS UR: NORMAL

## 2022-10-21 RX ORDER — ESCITALOPRAM OXALATE 20 MG/1
TABLET ORAL
Qty: 90 TABLET | Refills: 3 | Status: SHIPPED | OUTPATIENT
Start: 2022-10-21

## 2022-10-21 NOTE — TELEPHONE ENCOUNTER
Rx Refill Note  Requested Prescriptions     Pending Prescriptions Disp Refills   • escitalopram (LEXAPRO) 20 MG tablet [Pharmacy Med Name: ESCITALOPRAM 20 MG TABLET] 90 tablet 3     Sig: TAKE 1 TABLET BY MOUTH EVERY DAY      Last office visit with prescribing clinician: 7/1/2022      Next office visit with prescribing clinician: Visit date not found   CPE done 07/02/2023     {TIP  Please add Last Relevant Lab Date if appropriate: 10/02/2022  {TIP  Is Refill Pharmacy correct?: yes    Yahaira Kaur MA  10/21/22, 09:04 CDT

## 2022-11-07 NOTE — TELEPHONE ENCOUNTER
Rx Refill Note  Requested Prescriptions     Pending Prescriptions Disp Refills   • metFORMIN (GLUCOPHAGE) 500 MG tablet [Pharmacy Med Name: METFORMIN  MG TABLET] 180 tablet 3     Sig: TAKE 1 TABLET BY MOUTH TWICE A DAY WITH MEALS      Last office visit with prescribing clinician: 7/1/2022      Next office visit with prescribing clinician: Visit date not found       {TIP  Please add Last Relevant Lab 7/1/22    Yahaira Renteria MA  11/07/22, 07:41 CST

## 2022-12-15 DIAGNOSIS — F41.1 GENERALIZED ANXIETY DISORDER: ICD-10-CM

## 2022-12-15 RX ORDER — CLONAZEPAM 0.5 MG/1
0.5 TABLET ORAL 2 TIMES DAILY PRN
Qty: 60 TABLET | Refills: 0 | Status: CANCELLED | OUTPATIENT
Start: 2022-12-15

## 2022-12-15 NOTE — TELEPHONE ENCOUNTER
Drug therapy appt 07/01/2022  Contract and UDS done 07/01/2022    Controlled medication appt needed.  My chart message sent to patient.

## 2023-01-10 RX ORDER — ATORVASTATIN CALCIUM 40 MG/1
TABLET, FILM COATED ORAL
Qty: 90 TABLET | Refills: 3 | Status: SHIPPED | OUTPATIENT
Start: 2023-01-10 | End: 2023-01-10 | Stop reason: SDUPTHER

## 2023-01-10 RX ORDER — ATORVASTATIN CALCIUM 40 MG/1
40 TABLET, FILM COATED ORAL DAILY
Qty: 90 TABLET | Refills: 1 | Status: SHIPPED | OUTPATIENT
Start: 2023-01-10

## 2023-01-10 NOTE — TELEPHONE ENCOUNTER
Rx Refill Note  Requested Prescriptions     Pending Prescriptions Disp Refills   • atorvastatin (LIPITOR) 40 MG tablet 90 tablet 3     Sig: Take 1 tablet by mouth Daily.      Last office visit with prescribing clinician: 7/1/2022   Last telemedicine visit with prescribing clinician: Visit date not found   Next office visit with prescribing clinician: Visit date not found       {TIP  Please add Last Relevant Lab 7/1/22                   Would you like a call back once the refill request has been completed: [] Yes [] No    If the office needs to give you a call back, can they leave a voicemail: [] Yes [] No    Yahaira Renteria MA  01/10/23, 07:32 CST

## 2023-11-30 DIAGNOSIS — F32.89 OTHER DEPRESSION: Primary | ICD-10-CM

## 2023-11-30 RX ORDER — ESCITALOPRAM OXALATE 20 MG/1
20 TABLET ORAL DAILY
Qty: 90 TABLET | Refills: 2 | Status: SHIPPED | OUTPATIENT
Start: 2023-11-30

## 2023-11-30 NOTE — TELEPHONE ENCOUNTER
Rx Refill Note  Requested Prescriptions     Pending Prescriptions Disp Refills    escitalopram (LEXAPRO) 20 MG tablet 90 tablet 3     Sig: Take 1 tablet by mouth Daily.      Last office visit with prescribing clinician: 7/1/2022   Last telemedicine visit with prescribing clinician: Visit date not found   Next office visit with prescribing clinician: Visit date not found   CPE done 07/01/2022    {TIP  Please add Last Relevant Lab Date if appropriate: 04/07/2023             {TIP  Is Refill Pharmacy correct?: yes    Would you like a call back once the refill request has been completed: [] Yes [x] No    If the office needs to give you a call back, can they leave a voicemail: [] Yes [] No    Yahaira Kaur MA  11/30/23, 10:08 CST

## 2025-05-01 DIAGNOSIS — F32.89 OTHER DEPRESSION: ICD-10-CM

## 2025-05-01 RX ORDER — ESCITALOPRAM OXALATE 20 MG/1
20 TABLET ORAL DAILY
Qty: 90 TABLET | Refills: 1 | OUTPATIENT
Start: 2025-05-01

## (undated) DEVICE — STERILE SLEEVE: Brand: CONVERTORS

## (undated) DEVICE — LIGHT HANDLE: Brand: DEVON

## (undated) DEVICE — KT DRN EVAC WND PVC PCH WTROC RND 10F400

## (undated) DEVICE — STERILE POLYISOPRENE POWDER-FREE SURGICAL GLOVES WITH EMOLLIENT COATING: Brand: PROTEXIS

## (undated) DEVICE — SUT VIC 2/0 CT1 CR8 18IN J839D

## (undated) DEVICE — GAUZE,SPONGE,4"X4",16PLY,XRAY,STRL,LF: Brand: MEDLINE

## (undated) DEVICE — CONTAINER,SPECIMEN,OR STERILE,4OZ: Brand: MEDLINE

## (undated) DEVICE — CONN TBG Y 5 IN 1 LF STRL

## (undated) DEVICE — HOOD, PEEL-AWAY: Brand: FLYTE

## (undated) DEVICE — GUIDE WIRE, BALL-TIPPED, STERILE

## (undated) DEVICE — DRAPE,U/ SHT,SPLIT,PLAS,STERIL: Brand: MEDLINE

## (undated) DEVICE — GOWN,PREVENTION PLUS,XLONG/XLARGE,STRL: Brand: MEDLINE

## (undated) DEVICE — HANDPIECE SET WITH COAXIAL HIGH FLOW TIP AND SUCTION TUBE: Brand: INTERPULSE

## (undated) DEVICE — GLV SURG TRIUMPH LT PF LTX 6 STRL

## (undated) DEVICE — GLV SURG SENSICARE POLYISPRN W/ALOE PF LF 6 GRN STRL

## (undated) DEVICE — GLV SURG ORTHO BIOGEL OPTIFIT PF SZ9

## (undated) DEVICE — STRYKER PERFORMANCE SERIES SAGITTAL BLADE: Brand: STRYKER PERFORMANCE SERIES

## (undated) DEVICE — STCKNT IMPERV 12IN STRL

## (undated) DEVICE — TOWEL,OR,DSP,ST,BLUE,DLX,4/PK,20PK/CS: Brand: MEDLINE

## (undated) DEVICE — SUT VICRYL O M0-4 27IN ETHCPP71D

## (undated) DEVICE — ELECTRD BLD STD SS 3/32X6.5IN

## (undated) DEVICE — SPNG GZ WOVN 4X4IN 12PLY 10/BX STRL

## (undated) DEVICE — CVR FLUOROSCOPE C/ARM W/TP 36X28IN

## (undated) DEVICE — SHEET,DRAPE,53X77,STERILE: Brand: MEDLINE

## (undated) DEVICE — DRSNG GZ CURAD XEROFORM NONADHS 5X9IN STRL

## (undated) DEVICE — DUAL CUT SAGITTAL BLADE

## (undated) DEVICE — GLV SURG TRIUMPH LT PF LTX 6.5 STRL

## (undated) DEVICE — SPNG DRN AMD EXCILON 6PLY 4X4IN PK/2

## (undated) DEVICE — UNDYED BRAIDED (POLYGLACTIN 910), SYNTHETIC ABSORBABLE SUTURE: Brand: COATED VICRYL

## (undated) DEVICE — GAUZE,SPONGE,FLUFF,6"X6.75",STRL,5/TRAY: Brand: MEDLINE

## (undated) DEVICE — PK HIP LF 60

## (undated) DEVICE — PK EXTRM LF 60

## (undated) DEVICE — SPNG LAP 18X18IN LF STRL PK/5

## (undated) DEVICE — GLV SURG SENSICARE PI PF LF 7 GRN STRL

## (undated) DEVICE — PEEL-AWAY TOGA, 2X LARGE: Brand: FLYTE

## (undated) DEVICE — SOL IRR NACL 0.9PCT BT 1000ML

## (undated) DEVICE — DRSNG WND BORDR/ADHS NONADHR/GZ LF 4X10IN STRL

## (undated) DEVICE — GLV SURG SENSICARE GREEN W/ALOE PF LF 6.5 STRL

## (undated) DEVICE — BNDG ELAS CO-FLEX SLF ADHR 4IN5YD LF STRL

## (undated) DEVICE — CATHETER,URETHRAL,REDRUBBER,STRL,16FR: Brand: MEDLINE

## (undated) DEVICE — UNDRPD BREATH 23X36 BG/10

## (undated) DEVICE — STPLR SKIN VISISTAT WD 35CT

## (undated) DEVICE — GLV SURG TRIUMPH LT PF LTX 7 STRL

## (undated) DEVICE — GLV SURG SENSICARE GREEN W/ALOE PF LF 7 STRL

## (undated) DEVICE — GLV SURG SENSICARE GREEN W/ALOE PF LF 8.5 STRL

## (undated) DEVICE — GLV SURG TRIUMPH ORTHO W/ALOE PF LTX 8 STRL

## (undated) DEVICE — 1314 FOAM STRIP NEEDLE COUNTER: Brand: DEVON